# Patient Record
Sex: FEMALE | Race: BLACK OR AFRICAN AMERICAN | NOT HISPANIC OR LATINO | Employment: OTHER | RURAL
[De-identification: names, ages, dates, MRNs, and addresses within clinical notes are randomized per-mention and may not be internally consistent; named-entity substitution may affect disease eponyms.]

---

## 2017-11-28 ENCOUNTER — HISTORICAL (OUTPATIENT)
Dept: ADMINISTRATIVE | Facility: HOSPITAL | Age: 72
End: 2017-11-28

## 2017-12-01 LAB
LAB AP CLINICAL INFORMATION: NORMAL
LAB AP GENERAL CAT - HISTORICAL: NORMAL
LAB AP INTERPRETATION/RESULT - HISTORICAL: NEGATIVE
LAB AP SPECIMEN ADEQUACY - HISTORICAL: NORMAL
LAB AP SPECIMEN SUBMITTED - HISTORICAL: NORMAL

## 2019-12-09 ENCOUNTER — HISTORICAL (OUTPATIENT)
Dept: ADMINISTRATIVE | Facility: HOSPITAL | Age: 74
End: 2019-12-09

## 2019-12-11 LAB
LAB AP CLINICAL INFORMATION: NORMAL
LAB AP COMMENTS: NORMAL
LAB AP GENERAL CAT - HISTORICAL: NORMAL
LAB AP INTERPRETATION/RESULT - HISTORICAL: NEGATIVE
LAB AP SPECIMEN ADEQUACY - HISTORICAL: NORMAL
LAB AP SPECIMEN SUBMITTED - HISTORICAL: NORMAL

## 2021-10-21 ENCOUNTER — OFFICE VISIT (OUTPATIENT)
Dept: FAMILY MEDICINE | Facility: CLINIC | Age: 76
End: 2021-10-21
Payer: MEDICARE

## 2021-10-21 VITALS
HEIGHT: 71 IN | DIASTOLIC BLOOD PRESSURE: 69 MMHG | RESPIRATION RATE: 18 BRPM | WEIGHT: 216.38 LBS | OXYGEN SATURATION: 99 % | HEART RATE: 72 BPM | BODY MASS INDEX: 30.29 KG/M2 | SYSTOLIC BLOOD PRESSURE: 116 MMHG

## 2021-10-21 DIAGNOSIS — I10 PRIMARY HYPERTENSION: Chronic | ICD-10-CM

## 2021-10-21 DIAGNOSIS — J45.909 ASTHMA, UNSPECIFIED ASTHMA SEVERITY, UNSPECIFIED WHETHER COMPLICATED, UNSPECIFIED WHETHER PERSISTENT: Chronic | ICD-10-CM

## 2021-10-21 DIAGNOSIS — I50.9 CONGESTIVE HEART FAILURE, UNSPECIFIED HF CHRONICITY, UNSPECIFIED HEART FAILURE TYPE: Primary | Chronic | ICD-10-CM

## 2021-10-21 DIAGNOSIS — R05.9 COUGH: Chronic | ICD-10-CM

## 2021-10-21 PROCEDURE — 99204 OFFICE O/P NEW MOD 45 MIN: CPT | Mod: ,,, | Performed by: INTERNAL MEDICINE

## 2021-10-21 PROCEDURE — 99204 PR OFFICE/OUTPT VISIT, NEW, LEVL IV, 45-59 MIN: ICD-10-PCS | Mod: ,,, | Performed by: INTERNAL MEDICINE

## 2021-10-21 PROCEDURE — 90686 IIV4 VACC NO PRSV 0.5 ML IM: CPT | Mod: ,,, | Performed by: INTERNAL MEDICINE

## 2021-10-21 PROCEDURE — 90686 FLU VACCINE (QUAD) GREATER THAN OR EQUAL TO 3YO PRESERVATIVE FREE IM: ICD-10-PCS | Mod: ,,, | Performed by: INTERNAL MEDICINE

## 2021-10-21 PROCEDURE — G0008 FLU VACCINE (QUAD) GREATER THAN OR EQUAL TO 3YO PRESERVATIVE FREE IM: ICD-10-PCS | Mod: ,,, | Performed by: INTERNAL MEDICINE

## 2021-10-21 PROCEDURE — G0008 ADMIN INFLUENZA VIRUS VAC: HCPCS | Mod: ,,, | Performed by: INTERNAL MEDICINE

## 2021-10-21 RX ORDER — FUROSEMIDE 80 MG/1
80 TABLET ORAL DAILY
COMMUNITY
Start: 2021-08-26 | End: 2023-08-03 | Stop reason: SDUPTHER

## 2021-10-21 RX ORDER — CARVEDILOL 25 MG/1
25 TABLET ORAL 2 TIMES DAILY WITH MEALS
COMMUNITY
Start: 2021-10-04

## 2021-10-21 RX ORDER — ALBUTEROL SULFATE 90 UG/1
2 AEROSOL, METERED RESPIRATORY (INHALATION) EVERY 6 HOURS PRN
Qty: 6.7 G | Refills: 3 | Status: SHIPPED | OUTPATIENT
Start: 2021-10-21 | End: 2023-11-20 | Stop reason: SDUPTHER

## 2021-10-21 RX ORDER — SACUBITRIL AND VALSARTAN 97; 103 MG/1; MG/1
1 TABLET, FILM COATED ORAL 2 TIMES DAILY
COMMUNITY
Start: 2021-10-04

## 2021-10-21 RX ORDER — SPIRONOLACTONE 25 MG/1
50 TABLET ORAL DAILY
COMMUNITY
Start: 2021-10-04 | End: 2023-09-26 | Stop reason: SDUPTHER

## 2021-10-21 RX ORDER — GUAIFENESIN 600 MG/1
600 TABLET, EXTENDED RELEASE ORAL 2 TIMES DAILY PRN
Qty: 20 TABLET | Refills: 1 | Status: SHIPPED | OUTPATIENT
Start: 2021-10-21 | End: 2023-10-24

## 2021-10-25 DIAGNOSIS — I50.9 CONGESTIVE HEART FAILURE, UNSPECIFIED HF CHRONICITY, UNSPECIFIED HEART FAILURE TYPE: Primary | ICD-10-CM

## 2021-10-25 LAB
ANION GAP SERPL CALCULATED.3IONS-SCNC: 13 MMOL/L (ref 7–16)
BUN SERPL-MCNC: 30 MG/DL (ref 7–18)
BUN/CREAT SERPL: 21 (ref 6–20)
CALCIUM SERPL-MCNC: 9.5 MG/DL (ref 8.5–10.1)
CHLORIDE SERPL-SCNC: 105 MMOL/L (ref 98–107)
CO2 SERPL-SCNC: 26 MMOL/L (ref 21–32)
CREAT SERPL-MCNC: 1.43 MG/DL (ref 0.55–1.02)
GLUCOSE SERPL-MCNC: 85 MG/DL (ref 74–106)
NT-PROBNP SERPL-MCNC: 237 PG/ML (ref 1–450)
POTASSIUM SERPL-SCNC: 4.4 MMOL/L (ref 3.5–5.1)
SODIUM SERPL-SCNC: 140 MMOL/L (ref 136–145)

## 2021-10-25 PROCEDURE — 80048 BASIC METABOLIC PNL TOTAL CA: CPT | Mod: ,,, | Performed by: CLINICAL MEDICAL LABORATORY

## 2021-10-25 PROCEDURE — 80048 BASIC METABOLIC PANEL: ICD-10-PCS | Mod: ,,, | Performed by: CLINICAL MEDICAL LABORATORY

## 2021-10-25 PROCEDURE — 83880 NT-PRO NATRIURETIC PEPTIDE: ICD-10-PCS | Mod: ,,, | Performed by: CLINICAL MEDICAL LABORATORY

## 2021-10-25 PROCEDURE — 83880 ASSAY OF NATRIURETIC PEPTIDE: CPT | Mod: ,,, | Performed by: CLINICAL MEDICAL LABORATORY

## 2021-11-18 ENCOUNTER — OFFICE VISIT (OUTPATIENT)
Dept: FAMILY MEDICINE | Facility: CLINIC | Age: 76
End: 2021-11-18
Payer: MEDICARE

## 2021-11-18 VITALS
BODY MASS INDEX: 30.57 KG/M2 | RESPIRATION RATE: 18 BRPM | SYSTOLIC BLOOD PRESSURE: 110 MMHG | OXYGEN SATURATION: 100 % | HEIGHT: 71 IN | WEIGHT: 218.38 LBS | HEART RATE: 60 BPM | DIASTOLIC BLOOD PRESSURE: 64 MMHG

## 2021-11-18 DIAGNOSIS — I10 PRIMARY HYPERTENSION: Primary | ICD-10-CM

## 2021-11-18 DIAGNOSIS — I50.9 CONGESTIVE HEART FAILURE, UNSPECIFIED HF CHRONICITY, UNSPECIFIED HEART FAILURE TYPE: ICD-10-CM

## 2021-11-18 PROCEDURE — 99213 PR OFFICE/OUTPT VISIT, EST, LEVL III, 20-29 MIN: ICD-10-PCS | Mod: ,,, | Performed by: INTERNAL MEDICINE

## 2021-11-18 PROCEDURE — 99213 OFFICE O/P EST LOW 20 MIN: CPT | Mod: ,,, | Performed by: INTERNAL MEDICINE

## 2021-11-22 ENCOUNTER — CLINICAL SUPPORT (OUTPATIENT)
Dept: FAMILY MEDICINE | Facility: CLINIC | Age: 76
End: 2021-11-22
Payer: MEDICARE

## 2021-11-22 DIAGNOSIS — Z23 NEED FOR VACCINATION: Primary | ICD-10-CM

## 2022-02-21 ENCOUNTER — OFFICE VISIT (OUTPATIENT)
Dept: FAMILY MEDICINE | Facility: CLINIC | Age: 77
End: 2022-02-21
Payer: MEDICARE

## 2022-02-21 VITALS
TEMPERATURE: 98 F | DIASTOLIC BLOOD PRESSURE: 60 MMHG | SYSTOLIC BLOOD PRESSURE: 103 MMHG | BODY MASS INDEX: 30.24 KG/M2 | OXYGEN SATURATION: 98 % | WEIGHT: 216 LBS | HEIGHT: 71 IN | RESPIRATION RATE: 18 BRPM | HEART RATE: 80 BPM

## 2022-02-21 DIAGNOSIS — Z79.899 OTHER LONG TERM (CURRENT) DRUG THERAPY: ICD-10-CM

## 2022-02-21 DIAGNOSIS — I10 PRIMARY HYPERTENSION: Primary | ICD-10-CM

## 2022-02-21 DIAGNOSIS — R53.83 OTHER FATIGUE: ICD-10-CM

## 2022-02-21 LAB
ANION GAP SERPL CALCULATED.3IONS-SCNC: 8 MMOL/L (ref 7–16)
BASOPHILS # BLD AUTO: 0.03 K/UL (ref 0–0.2)
BASOPHILS NFR BLD AUTO: 0.6 % (ref 0–1)
BUN SERPL-MCNC: 26 MG/DL (ref 7–18)
BUN/CREAT SERPL: 16 (ref 6–20)
CALCIUM SERPL-MCNC: 9.3 MG/DL (ref 8.5–10.1)
CHLORIDE SERPL-SCNC: 104 MMOL/L (ref 98–107)
CO2 SERPL-SCNC: 29 MMOL/L (ref 21–32)
CREAT SERPL-MCNC: 1.61 MG/DL (ref 0.55–1.02)
DIFFERENTIAL METHOD BLD: ABNORMAL
EOSINOPHIL # BLD AUTO: 0.16 K/UL (ref 0–0.5)
EOSINOPHIL NFR BLD AUTO: 3.1 % (ref 1–4)
ERYTHROCYTE [DISTWIDTH] IN BLOOD BY AUTOMATED COUNT: 14.1 % (ref 11.5–14.5)
GLUCOSE SERPL-MCNC: 89 MG/DL (ref 74–106)
HCT VFR BLD AUTO: 35.5 % (ref 38–47)
HGB BLD-MCNC: 11.6 G/DL (ref 12–16)
IMM GRANULOCYTES # BLD AUTO: 0.01 K/UL (ref 0–0.04)
IMM GRANULOCYTES NFR BLD: 0.2 % (ref 0–0.4)
LYMPHOCYTES # BLD AUTO: 2.12 K/UL (ref 1–4.8)
LYMPHOCYTES NFR BLD AUTO: 41.7 % (ref 27–41)
MCH RBC QN AUTO: 30.9 PG (ref 27–31)
MCHC RBC AUTO-ENTMCNC: 32.7 G/DL (ref 32–36)
MCV RBC AUTO: 94.4 FL (ref 80–96)
MONOCYTES # BLD AUTO: 0.52 K/UL (ref 0–0.8)
MONOCYTES NFR BLD AUTO: 10.2 % (ref 2–6)
MPC BLD CALC-MCNC: 11 FL (ref 9.4–12.4)
NEUTROPHILS # BLD AUTO: 2.24 K/UL (ref 1.8–7.7)
NEUTROPHILS NFR BLD AUTO: 44.2 % (ref 53–65)
NRBC # BLD AUTO: 0 X10E3/UL
NRBC, AUTO (.00): 0 %
PLATELET # BLD AUTO: 222 K/UL (ref 150–400)
POTASSIUM SERPL-SCNC: 4.4 MMOL/L (ref 3.5–5.1)
RBC # BLD AUTO: 3.76 M/UL (ref 4.2–5.4)
SODIUM SERPL-SCNC: 137 MMOL/L (ref 136–145)
TSH SERPL DL<=0.005 MIU/L-ACNC: 1.71 UIU/ML (ref 0.36–3.74)
VIT B12 SERPL-MCNC: 595 PG/ML (ref 193–986)
WBC # BLD AUTO: 5.08 K/UL (ref 4.5–11)

## 2022-02-21 PROCEDURE — 99214 OFFICE O/P EST MOD 30 MIN: CPT | Mod: ,,, | Performed by: INTERNAL MEDICINE

## 2022-02-21 PROCEDURE — 84443 TSH: ICD-10-PCS | Mod: ,,, | Performed by: CLINICAL MEDICAL LABORATORY

## 2022-02-21 PROCEDURE — 85025 CBC WITH DIFFERENTIAL: ICD-10-PCS | Mod: ,,, | Performed by: CLINICAL MEDICAL LABORATORY

## 2022-02-21 PROCEDURE — 80048 BASIC METABOLIC PNL TOTAL CA: CPT | Mod: ,,, | Performed by: CLINICAL MEDICAL LABORATORY

## 2022-02-21 PROCEDURE — 85025 COMPLETE CBC W/AUTO DIFF WBC: CPT | Mod: ,,, | Performed by: CLINICAL MEDICAL LABORATORY

## 2022-02-21 PROCEDURE — 80048 BASIC METABOLIC PANEL: ICD-10-PCS | Mod: ,,, | Performed by: CLINICAL MEDICAL LABORATORY

## 2022-02-21 PROCEDURE — 82607 VITAMIN B12: ICD-10-PCS | Mod: ,,, | Performed by: CLINICAL MEDICAL LABORATORY

## 2022-02-21 PROCEDURE — 82607 VITAMIN B-12: CPT | Mod: ,,, | Performed by: CLINICAL MEDICAL LABORATORY

## 2022-02-21 PROCEDURE — 99214 PR OFFICE/OUTPT VISIT, EST, LEVL IV, 30-39 MIN: ICD-10-PCS | Mod: ,,, | Performed by: INTERNAL MEDICINE

## 2022-02-21 PROCEDURE — 84443 ASSAY THYROID STIM HORMONE: CPT | Mod: ,,, | Performed by: CLINICAL MEDICAL LABORATORY

## 2022-02-22 NOTE — PROGRESS NOTES
Subjective:       Patient ID: Taina Medrano is a 77 y.o. female.    Chief Complaint: Hypertension    Patient is here today for check up evaluation. Patient states has been having lack of energy. States has been taking care of her sick  and has not gotten much sleep. Recommend taking a multi vitamin which she states she does already. Last labs done 3 months ago show slight dehydration. Advise to increase water intake. Will order repeat lab work today and order Vit B12 and TSH level. Will follow in 2 months.       Current Medications:    Current Outpatient Medications:     albuterol (PROVENTIL HFA) 90 mcg/actuation inhaler, Inhale 2 puffs into the lungs every 6 (six) hours as needed for Wheezing. Rescue, Disp: 6.7 g, Rfl: 3    carvediloL (COREG) 25 MG tablet, Take 25 mg by mouth 2 (two) times daily with meals. , Disp: , Rfl:     ENTRESTO  mg per tablet, Take 1 tablet by mouth 2 (two) times daily. , Disp: , Rfl:     furosemide (LASIX) 80 MG tablet, Take 80 mg by mouth once daily. , Disp: , Rfl:     guaiFENesin (MUCINEX) 600 mg 12 hr tablet, Take 1 tablet (600 mg total) by mouth 2 (two) times daily as needed for Congestion., Disp: 20 tablet, Rfl: 1    spironolactone (ALDACTONE) 25 MG tablet, Take 50 mg by mouth once daily. , Disp: , Rfl:     Last Labs:     Office Visit on 02/21/2022   Component Date Value    Sodium 02/21/2022 137     Potassium 02/21/2022 4.4     Chloride 02/21/2022 104     CO2 02/21/2022 29     Anion Gap 02/21/2022 8     Glucose 02/21/2022 89     BUN 02/21/2022 26 (A)    Creatinine 02/21/2022 1.61 (A)    BUN/Creatinine Ratio 02/21/2022 16     Calcium 02/21/2022 9.3     eGFR 02/21/2022 33 (A)    Vitamin B12 02/21/2022 595     TSH 02/21/2022 1.710     WBC 02/21/2022 5.08     RBC 02/21/2022 3.76 (A)    Hemoglobin 02/21/2022 11.6 (A)    Hematocrit 02/21/2022 35.5 (A)    MCV 02/21/2022 94.4     MCH 02/21/2022 30.9     MCHC 02/21/2022 32.7     RDW 02/21/2022 14.1      Platelet Count 02/21/2022 222     MPV 02/21/2022 11.0     Neutrophils % 02/21/2022 44.2 (A)    Lymphocytes % 02/21/2022 41.7 (A)    Monocytes % 02/21/2022 10.2 (A)    Eosinophils % 02/21/2022 3.1     Basophils % 02/21/2022 0.6     Immature Granulocytes % 02/21/2022 0.2     nRBC, Auto 02/21/2022 0.0     Neutrophils, Abs 02/21/2022 2.24     Lymphocytes, Absolute 02/21/2022 2.12     Monocytes, Absolute 02/21/2022 0.52     Eosinophils, Absolute 02/21/2022 0.16     Basophils, Absolute 02/21/2022 0.03     Immature Granulocytes, A* 02/21/2022 0.01     nRBC, Absolute 02/21/2022 0.00     Diff Type 02/21/2022 Auto        Last Imaging:  No image results found.         Review of Systems   Constitutional: Positive for activity change and fatigue.   All other systems reviewed and are negative.        Objective:      Physical Exam  Vitals reviewed.   Constitutional:       Appearance: Normal appearance.   Cardiovascular:      Rate and Rhythm: Normal rate and regular rhythm.      Pulses: Normal pulses.      Heart sounds: Normal heart sounds.   Pulmonary:      Effort: Pulmonary effort is normal.      Breath sounds: Normal breath sounds.   Abdominal:      General: Abdomen is flat. Bowel sounds are normal.      Palpations: Abdomen is soft.   Musculoskeletal:         General: Normal range of motion.      Cervical back: Normal range of motion and neck supple.   Skin:     General: Skin is warm and dry.   Neurological:      General: No focal deficit present.      Mental Status: She is alert and oriented to person, place, and time. Mental status is at baseline.         Assessment:       1. Primary hypertension  Basic Metabolic Panel    CBC Auto Differential    Vitamin B12    TSH    Basic Metabolic Panel    CBC Auto Differential    Vitamin B12    TSH   2. Other fatigue  Basic Metabolic Panel    CBC Auto Differential    Vitamin B12    TSH    Basic Metabolic Panel    CBC Auto Differential    Vitamin B12    TSH   3. Other long  term (current) drug therapy   Vitamin B12    Vitamin B12        Plan:         Taina was seen today for hypertension.    Diagnoses and all orders for this visit:    Primary hypertension  -     Basic Metabolic Panel; Future  -     CBC Auto Differential; Future  -     Vitamin B12; Future  -     TSH; Future  -     Basic Metabolic Panel  -     CBC Auto Differential  -     Vitamin B12  -     TSH    Other fatigue  -     Basic Metabolic Panel; Future  -     CBC Auto Differential; Future  -     Vitamin B12; Future  -     TSH; Future  -     Basic Metabolic Panel  -     CBC Auto Differential  -     Vitamin B12  -     TSH    Other long term (current) drug therapy   -     Vitamin B12; Future  -     Vitamin B12

## 2022-03-11 DIAGNOSIS — Z71.89 COMPLEX CARE COORDINATION: ICD-10-CM

## 2022-05-11 ENCOUNTER — OFFICE VISIT (OUTPATIENT)
Dept: FAMILY MEDICINE | Facility: CLINIC | Age: 77
End: 2022-05-11
Payer: MEDICARE

## 2022-05-11 VITALS
SYSTOLIC BLOOD PRESSURE: 110 MMHG | DIASTOLIC BLOOD PRESSURE: 70 MMHG | BODY MASS INDEX: 29.82 KG/M2 | HEIGHT: 71 IN | RESPIRATION RATE: 16 BRPM | OXYGEN SATURATION: 98 % | WEIGHT: 213 LBS | HEART RATE: 70 BPM | TEMPERATURE: 98 F

## 2022-05-11 DIAGNOSIS — I25.10 CORONARY ARTERIOSCLEROSIS: ICD-10-CM

## 2022-05-11 DIAGNOSIS — E78.5 HYPERLIPIDEMIA, UNSPECIFIED HYPERLIPIDEMIA TYPE: ICD-10-CM

## 2022-05-11 DIAGNOSIS — Z12.4 SCREENING FOR CERVICAL CANCER: ICD-10-CM

## 2022-05-11 DIAGNOSIS — Z00.00 ENCOUNTER FOR SUBSEQUENT ANNUAL WELLNESS VISIT (AWV) IN MEDICARE PATIENT: Primary | ICD-10-CM

## 2022-05-11 DIAGNOSIS — Z13.820 SCREENING FOR OSTEOPOROSIS: ICD-10-CM

## 2022-05-11 DIAGNOSIS — Z12.39 ENCOUNTER FOR SCREENING FOR MALIGNANT NEOPLASM OF BREAST, UNSPECIFIED SCREENING MODALITY: ICD-10-CM

## 2022-05-11 DIAGNOSIS — Z12.31 ENCOUNTER FOR SCREENING MAMMOGRAM FOR MALIGNANT NEOPLASM OF BREAST: ICD-10-CM

## 2022-05-11 DIAGNOSIS — Z78.0 POST-MENOPAUSAL: ICD-10-CM

## 2022-05-11 DIAGNOSIS — Z11.59 SCREENING FOR VIRAL DISEASE: ICD-10-CM

## 2022-05-11 DIAGNOSIS — I10 ESSENTIAL HYPERTENSION: ICD-10-CM

## 2022-05-11 LAB
CHOLEST SERPL-MCNC: 179 MG/DL (ref 0–200)
CHOLEST/HDLC SERPL: 3.1 {RATIO}
HCV AB SER QL: NORMAL
HDLC SERPL-MCNC: 58 MG/DL (ref 40–60)
LDLC SERPL CALC-MCNC: 102 MG/DL
LDLC/HDLC SERPL: 1.8 {RATIO}
NONHDLC SERPL-MCNC: 121 MG/DL
TRIGL SERPL-MCNC: 94 MG/DL (ref 35–150)
VLDLC SERPL-MCNC: 19 MG/DL

## 2022-05-11 PROCEDURE — 80061 LIPID PANEL: CPT | Mod: ,,, | Performed by: CLINICAL MEDICAL LABORATORY

## 2022-05-11 PROCEDURE — 80061 LIPID PANEL: ICD-10-PCS | Mod: ,,, | Performed by: CLINICAL MEDICAL LABORATORY

## 2022-05-11 PROCEDURE — 86803 HEPATITIS C ANTIBODY: ICD-10-PCS | Mod: ,,, | Performed by: CLINICAL MEDICAL LABORATORY

## 2022-05-11 PROCEDURE — 86803 HEPATITIS C AB TEST: CPT | Mod: ,,, | Performed by: CLINICAL MEDICAL LABORATORY

## 2022-05-11 PROCEDURE — G0439 PR MEDICARE ANNUAL WELLNESS SUBSEQUENT VISIT: ICD-10-PCS | Mod: ,,, | Performed by: NURSE PRACTITIONER

## 2022-05-11 PROCEDURE — G0439 PPPS, SUBSEQ VISIT: HCPCS | Mod: ,,, | Performed by: NURSE PRACTITIONER

## 2022-05-11 NOTE — PATIENT INSTRUCTIONS
Counseling and Referral of Other Preventative  (Italic type indicates deductible and co-insurance are waived)    Patient Name: Taina Medrano  Today's Date: 5/11/2022    Health Maintenance         Date Due Completion Date    Hepatitis C Screening Never done ---    Lipid Panel Never done ---    TETANUS VACCINE Never done ---    DEXA Scan Never done ---    Shingles Vaccine (1 of 2) Never done ---    Pneumococcal Vaccines (Age 65+) (1 - PCV) Never done ---    COVID-19 Vaccine (4 - Booster for Moderna series) 03/20/2022 11/20/2021          No orders of the defined types were placed in this encounter.

## 2022-05-13 ENCOUNTER — IMMUNIZATION (OUTPATIENT)
Dept: FAMILY MEDICINE | Facility: CLINIC | Age: 77
End: 2022-05-13
Payer: MEDICARE

## 2022-05-13 DIAGNOSIS — Z23 NEED FOR VACCINATION: Primary | ICD-10-CM

## 2022-05-13 PROCEDURE — 91306 COVID-19, MRNA, LNP-S, PF, 100 MCG/0.25 ML DOSE VACCINE (MODERNA BOOSTER): CPT | Mod: ,,, | Performed by: INTERNAL MEDICINE

## 2022-05-13 PROCEDURE — 0064A COVID-19, MRNA, LNP-S, PF, 100 MCG/0.25 ML DOSE VACCINE (MODERNA BOOSTER): ICD-10-PCS | Mod: ,,, | Performed by: INTERNAL MEDICINE

## 2022-05-13 PROCEDURE — 0064A COVID-19, MRNA, LNP-S, PF, 100 MCG/0.25 ML DOSE VACCINE (MODERNA BOOSTER): CPT | Mod: ,,, | Performed by: INTERNAL MEDICINE

## 2022-05-13 PROCEDURE — 91306 COVID-19, MRNA, LNP-S, PF, 100 MCG/0.25 ML DOSE VACCINE (MODERNA BOOSTER): ICD-10-PCS | Mod: ,,, | Performed by: INTERNAL MEDICINE

## 2022-05-17 ENCOUNTER — OFFICE VISIT (OUTPATIENT)
Dept: FAMILY MEDICINE | Facility: CLINIC | Age: 77
End: 2022-05-17
Payer: MEDICARE

## 2022-05-17 VITALS
TEMPERATURE: 98 F | DIASTOLIC BLOOD PRESSURE: 56 MMHG | WEIGHT: 211.38 LBS | BODY MASS INDEX: 29.59 KG/M2 | RESPIRATION RATE: 20 BRPM | HEART RATE: 86 BPM | SYSTOLIC BLOOD PRESSURE: 108 MMHG | OXYGEN SATURATION: 99 % | HEIGHT: 71 IN

## 2022-05-17 DIAGNOSIS — D63.8 ANEMIA IN OTHER CHRONIC DISEASES CLASSIFIED ELSEWHERE: ICD-10-CM

## 2022-05-17 DIAGNOSIS — N18.2 STAGE 2 CHRONIC KIDNEY DISEASE: Primary | ICD-10-CM

## 2022-05-17 LAB
CREAT UR-MCNC: 98 MG/DL (ref 28–219)
MICROALBUMIN UR-MCNC: 0.7 MG/DL (ref 0–2.8)
MICROALBUMIN/CREAT RATIO PNL UR: 7.1 MG/G (ref 0–30)
PROT UR-MCNC: <5 MG/DL (ref 0–11.9)

## 2022-05-17 PROCEDURE — 84156 PROTEIN, QUANTITATIVE, URINE RANDOM: ICD-10-PCS | Mod: ,,, | Performed by: CLINICAL MEDICAL LABORATORY

## 2022-05-17 PROCEDURE — 99214 OFFICE O/P EST MOD 30 MIN: CPT | Mod: ,,, | Performed by: INTERNAL MEDICINE

## 2022-05-17 PROCEDURE — 82043 MICROALBUMIN / CREATININE RATIO URINE: ICD-10-PCS | Mod: ,,, | Performed by: CLINICAL MEDICAL LABORATORY

## 2022-05-17 PROCEDURE — 82570 ASSAY OF URINE CREATININE: CPT | Mod: ,,, | Performed by: CLINICAL MEDICAL LABORATORY

## 2022-05-17 PROCEDURE — 99214 PR OFFICE/OUTPT VISIT, EST, LEVL IV, 30-39 MIN: ICD-10-PCS | Mod: ,,, | Performed by: INTERNAL MEDICINE

## 2022-05-17 PROCEDURE — 82043 UR ALBUMIN QUANTITATIVE: CPT | Mod: ,,, | Performed by: CLINICAL MEDICAL LABORATORY

## 2022-05-17 PROCEDURE — 82570 MICROALBUMIN / CREATININE RATIO URINE: ICD-10-PCS | Mod: ,,, | Performed by: CLINICAL MEDICAL LABORATORY

## 2022-05-17 PROCEDURE — 84156 ASSAY OF PROTEIN URINE: CPT | Mod: ,,, | Performed by: CLINICAL MEDICAL LABORATORY

## 2022-05-17 NOTE — PATIENT INSTRUCTIONS
Taina was seen today for follow-up and hypertension.    Diagnoses and all orders for this visit:    Stage 2 chronic kidney disease  -     Protein, Random Urine; Future  -     Microalbumin/Creatinine Ratio, Urine; Future  -     Protein, Random Urine  -     Microalbumin/Creatinine Ratio, Urine    Anemia in other chronic diseases classified elsewhere

## 2022-05-17 NOTE — PROGRESS NOTES
Subjective:       Patient ID: Taina Medrano is a 77 y.o. female.    Chief Complaint: Follow-up and Hypertension    Patient is here today for check up evaluation. Patient pressure is stable today. Recent labs reviewed and all within normal limits. Does show slight abnormal kidney function. Advise to increase water intake. Will check urine today for albumin and protein. Will follow in 3 months.     Follow-up    Hypertension        Current Medications:    Current Outpatient Medications:     albuterol (PROVENTIL HFA) 90 mcg/actuation inhaler, Inhale 2 puffs into the lungs every 6 (six) hours as needed for Wheezing. Rescue, Disp: 6.7 g, Rfl: 3    carvediloL (COREG) 25 MG tablet, Take 25 mg by mouth 2 (two) times daily with meals. , Disp: , Rfl:     ENTRESTO  mg per tablet, Take 1 tablet by mouth 2 (two) times daily. , Disp: , Rfl:     furosemide (LASIX) 80 MG tablet, Take 80 mg by mouth once daily. , Disp: , Rfl:     guaiFENesin (MUCINEX) 600 mg 12 hr tablet, Take 1 tablet (600 mg total) by mouth 2 (two) times daily as needed for Congestion., Disp: 20 tablet, Rfl: 1    spironolactone (ALDACTONE) 25 MG tablet, Take 50 mg by mouth once daily. , Disp: , Rfl:     Last Labs:     Office Visit on 05/11/2022   Component Date Value    Hepatitis C Ab 05/11/2022 Non-Reactive     Triglycerides 05/11/2022 94     Cholesterol 05/11/2022 179     HDL Cholesterol 05/11/2022 58     Cholesterol/HDL Ratio (R* 05/11/2022 3.1     Non-HDL 05/11/2022 121     LDL Calculated 05/11/2022 102     LDL/HDL 05/11/2022 1.8     VLDL 05/11/2022 19        Last Imaging:  No image results found.         Review of Systems   All other systems reviewed and are negative.        Objective:      Physical Exam  Vitals reviewed.   Constitutional:       Appearance: Normal appearance.   Cardiovascular:      Rate and Rhythm: Normal rate and regular rhythm.      Pulses: Normal pulses.      Heart sounds: Normal heart sounds.   Pulmonary:      Effort:  Pulmonary effort is normal.      Breath sounds: Normal breath sounds.   Abdominal:      General: Abdomen is flat. Bowel sounds are normal.      Palpations: Abdomen is soft.   Musculoskeletal:         General: Normal range of motion.      Cervical back: Normal range of motion and neck supple.   Skin:     General: Skin is warm and dry.   Neurological:      General: No focal deficit present.      Mental Status: She is alert and oriented to person, place, and time. Mental status is at baseline.         Assessment:       1. Stage 2 chronic kidney disease  Protein, Random Urine    Microalbumin/Creatinine Ratio, Urine    Protein, Random Urine    Microalbumin/Creatinine Ratio, Urine   2. Anemia in other chronic diseases classified elsewhere          Plan:         Taina was seen today for follow-up and hypertension.    Diagnoses and all orders for this visit:    Stage 2 chronic kidney disease  -     Protein, Random Urine; Future  -     Microalbumin/Creatinine Ratio, Urine; Future  -     Protein, Random Urine  -     Microalbumin/Creatinine Ratio, Urine    Anemia in other chronic diseases classified elsewhere

## 2022-07-13 ENCOUNTER — HOSPITAL ENCOUNTER (OUTPATIENT)
Dept: RADIOLOGY | Facility: HOSPITAL | Age: 77
Discharge: HOME | End: 2022-07-13
Attending: NURSE PRACTITIONER
Payer: MEDICARE

## 2022-07-13 VITALS — BODY MASS INDEX: 29.96 KG/M2 | HEIGHT: 71 IN | WEIGHT: 214 LBS

## 2022-07-13 DIAGNOSIS — Z12.31 ENCOUNTER FOR SCREENING MAMMOGRAM FOR MALIGNANT NEOPLASM OF BREAST: ICD-10-CM

## 2022-07-13 DIAGNOSIS — Z12.39 ENCOUNTER FOR SCREENING FOR MALIGNANT NEOPLASM OF BREAST, UNSPECIFIED SCREENING MODALITY: ICD-10-CM

## 2022-07-13 PROCEDURE — 77067 MAMMO DIGITAL SCREENING BILAT: ICD-10-PCS | Mod: 26,,, | Performed by: RADIOLOGY

## 2022-07-13 PROCEDURE — 77067 SCR MAMMO BI INCL CAD: CPT | Mod: TC

## 2022-07-13 PROCEDURE — 77067 SCR MAMMO BI INCL CAD: CPT | Mod: 26,,, | Performed by: RADIOLOGY

## 2022-10-09 DIAGNOSIS — Z71.89 COMPLEX CARE COORDINATION: ICD-10-CM

## 2022-10-27 ENCOUNTER — APPOINTMENT (OUTPATIENT)
Dept: RADIOLOGY | Facility: CLINIC | Age: 77
End: 2022-10-27
Attending: INTERNAL MEDICINE
Payer: MEDICARE

## 2022-10-27 ENCOUNTER — OFFICE VISIT (OUTPATIENT)
Dept: FAMILY MEDICINE | Facility: CLINIC | Age: 77
End: 2022-10-27
Payer: MEDICARE

## 2022-10-27 VITALS
RESPIRATION RATE: 18 BRPM | WEIGHT: 217.38 LBS | SYSTOLIC BLOOD PRESSURE: 120 MMHG | HEIGHT: 71 IN | OXYGEN SATURATION: 98 % | BODY MASS INDEX: 30.43 KG/M2 | HEART RATE: 71 BPM | DIASTOLIC BLOOD PRESSURE: 64 MMHG | TEMPERATURE: 97 F

## 2022-10-27 DIAGNOSIS — I50.9 CONGESTIVE HEART FAILURE, UNSPECIFIED HF CHRONICITY, UNSPECIFIED HEART FAILURE TYPE: Primary | ICD-10-CM

## 2022-10-27 DIAGNOSIS — I50.9 CONGESTIVE HEART FAILURE, UNSPECIFIED HF CHRONICITY, UNSPECIFIED HEART FAILURE TYPE: ICD-10-CM

## 2022-10-27 DIAGNOSIS — J40 BRONCHITIS: ICD-10-CM

## 2022-10-27 DIAGNOSIS — Z91.89 AT RISK FOR LOSS OF BONE DENSITY: ICD-10-CM

## 2022-10-27 DIAGNOSIS — Z78.0 ASYMPTOMATIC MENOPAUSAL STATE: ICD-10-CM

## 2022-10-27 DIAGNOSIS — J06.9 UPPER RESPIRATORY TRACT INFECTION, UNSPECIFIED TYPE: ICD-10-CM

## 2022-10-27 DIAGNOSIS — Z78.0 POST-MENOPAUSAL: ICD-10-CM

## 2022-10-27 LAB
ANION GAP SERPL CALCULATED.3IONS-SCNC: 9 MMOL/L (ref 7–16)
BASOPHILS # BLD AUTO: 0.04 K/UL (ref 0–0.2)
BASOPHILS NFR BLD AUTO: 0.7 % (ref 0–1)
BUN SERPL-MCNC: 25 MG/DL (ref 7–18)
BUN/CREAT SERPL: 19 (ref 6–20)
CALCIUM SERPL-MCNC: 9.9 MG/DL (ref 8.5–10.1)
CHLORIDE SERPL-SCNC: 103 MMOL/L (ref 98–107)
CO2 SERPL-SCNC: 27 MMOL/L (ref 21–32)
CREAT SERPL-MCNC: 1.33 MG/DL (ref 0.55–1.02)
DIFFERENTIAL METHOD BLD: ABNORMAL
EGFR (NO RACE VARIABLE) (RUSH/TITUS): 41 ML/MIN/1.73M²
EOSINOPHIL # BLD AUTO: 0.19 K/UL (ref 0–0.5)
EOSINOPHIL NFR BLD AUTO: 3.4 % (ref 1–4)
ERYTHROCYTE [DISTWIDTH] IN BLOOD BY AUTOMATED COUNT: 14.1 % (ref 11.5–14.5)
GLUCOSE SERPL-MCNC: 82 MG/DL (ref 74–106)
HCT VFR BLD AUTO: 38 % (ref 38–47)
HGB BLD-MCNC: 12.4 G/DL (ref 12–16)
IMM GRANULOCYTES # BLD AUTO: 0.01 K/UL (ref 0–0.04)
IMM GRANULOCYTES NFR BLD: 0.2 % (ref 0–0.4)
LYMPHOCYTES # BLD AUTO: 2.25 K/UL (ref 1–4.8)
LYMPHOCYTES NFR BLD AUTO: 40.3 % (ref 27–41)
MCH RBC QN AUTO: 30.7 PG (ref 27–31)
MCHC RBC AUTO-ENTMCNC: 32.6 G/DL (ref 32–36)
MCV RBC AUTO: 94.1 FL (ref 80–96)
MONOCYTES # BLD AUTO: 0.5 K/UL (ref 0–0.8)
MONOCYTES NFR BLD AUTO: 8.9 % (ref 2–6)
MPC BLD CALC-MCNC: 11.4 FL (ref 9.4–12.4)
NEUTROPHILS # BLD AUTO: 2.6 K/UL (ref 1.8–7.7)
NEUTROPHILS NFR BLD AUTO: 46.5 % (ref 53–65)
NRBC # BLD AUTO: 0 X10E3/UL
NRBC, AUTO (.00): 0 %
NT-PROBNP SERPL-MCNC: 194 PG/ML (ref 1–450)
PLATELET # BLD AUTO: 207 K/UL (ref 150–400)
POTASSIUM SERPL-SCNC: 3.6 MMOL/L (ref 3.5–5.1)
RBC # BLD AUTO: 4.04 M/UL (ref 4.2–5.4)
SODIUM SERPL-SCNC: 135 MMOL/L (ref 136–145)
WBC # BLD AUTO: 5.59 K/UL (ref 4.5–11)

## 2022-10-27 PROCEDURE — 85025 CBC WITH DIFFERENTIAL: ICD-10-PCS | Mod: ,,, | Performed by: CLINICAL MEDICAL LABORATORY

## 2022-10-27 PROCEDURE — 80048 BASIC METABOLIC PANEL: ICD-10-PCS | Mod: ,,, | Performed by: CLINICAL MEDICAL LABORATORY

## 2022-10-27 PROCEDURE — 99214 OFFICE O/P EST MOD 30 MIN: CPT | Mod: ,,, | Performed by: INTERNAL MEDICINE

## 2022-10-27 PROCEDURE — 99214 PR OFFICE/OUTPT VISIT, EST, LEVL IV, 30-39 MIN: ICD-10-PCS | Mod: ,,, | Performed by: INTERNAL MEDICINE

## 2022-10-27 PROCEDURE — 83880 ASSAY OF NATRIURETIC PEPTIDE: CPT | Mod: ,,, | Performed by: CLINICAL MEDICAL LABORATORY

## 2022-10-27 PROCEDURE — 83880 NT-PRO NATRIURETIC PEPTIDE: ICD-10-PCS | Mod: ,,, | Performed by: CLINICAL MEDICAL LABORATORY

## 2022-10-27 PROCEDURE — 71046 X-RAY EXAM CHEST 2 VIEWS: CPT | Mod: TC,RHCUB | Performed by: INTERNAL MEDICINE

## 2022-10-27 PROCEDURE — 85025 COMPLETE CBC W/AUTO DIFF WBC: CPT | Mod: ,,, | Performed by: CLINICAL MEDICAL LABORATORY

## 2022-10-27 PROCEDURE — 80048 BASIC METABOLIC PNL TOTAL CA: CPT | Mod: ,,, | Performed by: CLINICAL MEDICAL LABORATORY

## 2022-10-28 NOTE — PATIENT INSTRUCTIONS
Taina was seen today for hypertension.    Diagnoses and all orders for this visit:    Congestive heart failure, unspecified HF chronicity, unspecified heart failure type  -     Basic Metabolic Panel; Future  -     CBC Auto Differential; Future  -     NT-Pro Natriuretic Peptide; Future  -     X-Ray Chest PA And Lateral; Future  -     Basic Metabolic Panel  -     CBC Auto Differential  -     NT-Pro Natriuretic Peptide    At risk for loss of bone density  -     DXA Bone Density Spine And Hip; Future    Asymptomatic menopausal state  -     DXA Bone Density Spine And Hip; Future    Post-menopausal  -     DXA Bone Density Spine And Hip; Future    Bronchitis    Upper respiratory tract infection, unspecified type    Other orders  The following orders have not been finalized:  -     azithromycin (Z-MORALES) 250 MG tablet  -     benzonatate (TESSALON) 100 MG capsule

## 2022-10-28 NOTE — PROGRESS NOTES
Subjective:       Patient ID: Taina Medrano is a 77 y.o. female.    Chief Complaint: Hypertension (No complaints for today )     Patient is here today for a follow up evaluation. Patient blood pressure is stable today on intake, 120/64. Patient has a complaint of chronic cough. Patient states she has a history of COPD and does experience SOB. Patient states she does cough up yellow phlegm during the morning time. Patient congested on auscultation. Patient denies fever and chills. Will order chest x-ray. Will order z-pack. Will order Tessalon pearls 100mg PO TID PRN for cough #30 RF2. Patient is requesting Dexa Scan due to missed appointment in the past. Will order Dexa Scan. Will order lab work today. Will follow in 6 weeks.     Current Medications:    Current Outpatient Medications:     albuterol (PROVENTIL HFA) 90 mcg/actuation inhaler, Inhale 2 puffs into the lungs every 6 (six) hours as needed for Wheezing. Rescue, Disp: 6.7 g, Rfl: 3    carvediloL (COREG) 25 MG tablet, Take 25 mg by mouth 2 (two) times daily with meals. , Disp: , Rfl:     ENTRESTO  mg per tablet, Take 1 tablet by mouth 2 (two) times daily. , Disp: , Rfl:     furosemide (LASIX) 80 MG tablet, Take 80 mg by mouth once daily. , Disp: , Rfl:     guaiFENesin (MUCINEX) 600 mg 12 hr tablet, Take 1 tablet (600 mg total) by mouth 2 (two) times daily as needed for Congestion., Disp: 20 tablet, Rfl: 1    spironolactone (ALDACTONE) 25 MG tablet, Take 50 mg by mouth once daily. , Disp: , Rfl:     Last Labs:     Office Visit on 10/27/2022   Component Date Value    Sodium 10/27/2022 135 (L)     Potassium 10/27/2022 3.6     Chloride 10/27/2022 103     CO2 10/27/2022 27     Anion Gap 10/27/2022 9     Glucose 10/27/2022 82     BUN 10/27/2022 25 (H)     Creatinine 10/27/2022 1.33 (H)     BUN/Creatinine Ratio 10/27/2022 19     Calcium 10/27/2022 9.9     eGFR 10/27/2022 41 (L)     ProBNP 10/27/2022 194     WBC 10/27/2022 5.59     RBC 10/27/2022 4.04 (L)      Hemoglobin 10/27/2022 12.4     Hematocrit 10/27/2022 38.0     MCV 10/27/2022 94.1     MCH 10/27/2022 30.7     MCHC 10/27/2022 32.6     RDW 10/27/2022 14.1     Platelet Count 10/27/2022 207     MPV 10/27/2022 11.4     Neutrophils % 10/27/2022 46.5 (L)     Lymphocytes % 10/27/2022 40.3     Monocytes % 10/27/2022 8.9 (H)     Eosinophils % 10/27/2022 3.4     Basophils % 10/27/2022 0.7     Immature Granulocytes % 10/27/2022 0.2     nRBC, Auto 10/27/2022 0.0     Neutrophils, Abs 10/27/2022 2.60     Lymphocytes, Absolute 10/27/2022 2.25     Monocytes, Absolute 10/27/2022 0.50     Eosinophils, Absolute 10/27/2022 0.19     Basophils, Absolute 10/27/2022 0.04     Immature Granulocytes, A* 10/27/2022 0.01     nRBC, Absolute 10/27/2022 0.00     Diff Type 10/27/2022 Auto        Last Imaging:  X-Ray Chest PA And Lateral  Narrative: EXAMINATION:  XR CHEST PA AND LATERAL    CLINICAL HISTORY:  Heart failure, unspecified    COMPARISON:  Chest x-ray December 25, 2019    TECHNIQUE:  Frontal and lateral views of the chest.    FINDINGS:  The cardiomediastinal silhouette is stable in configuration.  Cardiac conduction device again demonstrated.  Chronic change of the lungs without focal consolidation, pleural effusion, or pneumothorax.  Visualized osseous and surrounding soft tissue structures appear grossly unchanged.  Impression: No acute cardiopulmonary process demonstrated.    Point of Service: [facility]    Electronically signed by: Santy Tena  Date:    10/27/2022  Time:    17:25         Review of Systems   Respiratory:  Positive for cough.    All other systems reviewed and are negative.      Objective:      Physical Exam  Vitals reviewed.   Constitutional:       Appearance: Normal appearance. She is normal weight.   Cardiovascular:      Rate and Rhythm: Normal rate and regular rhythm.      Pulses: Normal pulses.      Heart sounds: Normal heart sounds.   Pulmonary:      Effort: Pulmonary effort is normal.      Breath  sounds: Normal breath sounds.   Abdominal:      General: Abdomen is flat. Bowel sounds are normal.      Palpations: Abdomen is soft.   Musculoskeletal:         General: Normal range of motion.      Cervical back: Normal range of motion and neck supple.   Skin:     General: Skin is warm and dry.   Neurological:      General: No focal deficit present.      Mental Status: She is alert and oriented to person, place, and time. Mental status is at baseline.       Assessment:       1. Congestive heart failure, unspecified HF chronicity, unspecified heart failure type  Basic Metabolic Panel    CBC Auto Differential    NT-Pro Natriuretic Peptide    X-Ray Chest PA And Lateral    Basic Metabolic Panel    CBC Auto Differential    NT-Pro Natriuretic Peptide      2. At risk for loss of bone density  DXA Bone Density Spine And Hip      3. Asymptomatic menopausal state  DXA Bone Density Spine And Hip      4. Post-menopausal  DXA Bone Density Spine And Hip      5. Bronchitis        6. Upper respiratory tract infection, unspecified type             Plan:         Taina was seen today for hypertension.    Diagnoses and all orders for this visit:    Congestive heart failure, unspecified HF chronicity, unspecified heart failure type  -     Basic Metabolic Panel; Future  -     CBC Auto Differential; Future  -     NT-Pro Natriuretic Peptide; Future  -     X-Ray Chest PA And Lateral; Future  -     Basic Metabolic Panel  -     CBC Auto Differential  -     NT-Pro Natriuretic Peptide    At risk for loss of bone density  -     DXA Bone Density Spine And Hip; Future    Asymptomatic menopausal state  -     DXA Bone Density Spine And Hip; Future    Post-menopausal  -     DXA Bone Density Spine And Hip; Future    Bronchitis    Upper respiratory tract infection, unspecified type    Other orders  The following orders have not been finalized:  -     azithromycin (Z-MORALES) 250 MG tablet  -     benzonatate (TESSALON) 100 MG capsule

## 2022-10-30 RX ORDER — AZITHROMYCIN 250 MG/1
TABLET, FILM COATED ORAL
Qty: 6 TABLET | Refills: 0 | Status: SHIPPED | OUTPATIENT
Start: 2022-10-30 | End: 2023-06-13 | Stop reason: SDUPTHER

## 2022-10-30 RX ORDER — BENZONATATE 100 MG/1
100 CAPSULE ORAL 3 TIMES DAILY PRN
Qty: 30 CAPSULE | Refills: 2 | Status: SHIPPED | OUTPATIENT
Start: 2022-10-30 | End: 2023-02-06 | Stop reason: SDUPTHER

## 2022-12-22 ENCOUNTER — APPOINTMENT (OUTPATIENT)
Dept: RADIOLOGY | Facility: CLINIC | Age: 77
End: 2022-12-22
Attending: INTERNAL MEDICINE
Payer: MEDICARE

## 2022-12-22 ENCOUNTER — OFFICE VISIT (OUTPATIENT)
Dept: FAMILY MEDICINE | Facility: CLINIC | Age: 77
End: 2022-12-22
Payer: MEDICARE

## 2022-12-22 VITALS
HEIGHT: 71 IN | SYSTOLIC BLOOD PRESSURE: 120 MMHG | OXYGEN SATURATION: 95 % | BODY MASS INDEX: 30.49 KG/M2 | TEMPERATURE: 97 F | DIASTOLIC BLOOD PRESSURE: 60 MMHG | RESPIRATION RATE: 18 BRPM | HEART RATE: 86 BPM | WEIGHT: 217.81 LBS

## 2022-12-22 DIAGNOSIS — I50.9 CONGESTIVE HEART FAILURE, UNSPECIFIED HF CHRONICITY, UNSPECIFIED HEART FAILURE TYPE: ICD-10-CM

## 2022-12-22 DIAGNOSIS — I50.9 CONGESTIVE HEART FAILURE, UNSPECIFIED HF CHRONICITY, UNSPECIFIED HEART FAILURE TYPE: Primary | ICD-10-CM

## 2022-12-22 DIAGNOSIS — Z78.0 POSTMENOPAUSAL: ICD-10-CM

## 2022-12-22 PROCEDURE — 99213 OFFICE O/P EST LOW 20 MIN: CPT | Mod: ,,, | Performed by: INTERNAL MEDICINE

## 2022-12-22 PROCEDURE — 90694 VACC AIIV4 NO PRSRV 0.5ML IM: CPT | Mod: ,,, | Performed by: INTERNAL MEDICINE

## 2022-12-22 PROCEDURE — G0008 ADMIN INFLUENZA VIRUS VAC: HCPCS | Mod: ,,, | Performed by: INTERNAL MEDICINE

## 2022-12-22 PROCEDURE — 90694 FLU VACCINE - QUADRIVALENT - ADJUVANTED: ICD-10-PCS | Mod: ,,, | Performed by: INTERNAL MEDICINE

## 2022-12-22 PROCEDURE — 99213 PR OFFICE/OUTPT VISIT, EST, LEVL III, 20-29 MIN: ICD-10-PCS | Mod: ,,, | Performed by: INTERNAL MEDICINE

## 2022-12-22 PROCEDURE — G0008 FLU VACCINE - QUADRIVALENT - ADJUVANTED: ICD-10-PCS | Mod: ,,, | Performed by: INTERNAL MEDICINE

## 2022-12-22 PROCEDURE — 71046 X-RAY EXAM CHEST 2 VIEWS: CPT | Mod: TC,RHCUB | Performed by: INTERNAL MEDICINE

## 2022-12-22 NOTE — PATIENT INSTRUCTIONS
Taina was seen today for congestive heart failure.    Diagnoses and all orders for this visit:    Congestive heart failure, unspecified HF chronicity, unspecified heart failure type  -     X-Ray Chest PA And Lateral; Future    Postmenopausal  -     DXA Bone Density Spine And Hip; Future    Other orders  -     Influenza - Quadrivalent (Adjuvanted)

## 2022-12-22 NOTE — PROGRESS NOTES
Subjective:       Patient ID: Taina Medrano is a 77 y.o. female.    Chief Complaint: Congestive Heart Failure (Follow up )    Patient is here today for a follow up evaluation. Patient blood pressure is stable today on intake, 120/60. Patient has a history of CHF. Patient states she does experience SOB when she does certain activities. Patient has a complaint of congestion. Will order chest x-ray. Health maintenance discussed with patient. Will give patient flu shot today. Will order Dexa Scan. Will follow with patient in 1 month.     Current Medications:    Current Outpatient Medications:     albuterol (PROVENTIL HFA) 90 mcg/actuation inhaler, Inhale 2 puffs into the lungs every 6 (six) hours as needed for Wheezing. Rescue, Disp: 6.7 g, Rfl: 3    benzonatate (TESSALON) 100 MG capsule, Take 1 capsule (100 mg total) by mouth 3 (three) times daily as needed for Cough., Disp: 30 capsule, Rfl: 2    carvediloL (COREG) 25 MG tablet, Take 25 mg by mouth 2 (two) times daily with meals. , Disp: , Rfl:     ENTRESTO  mg per tablet, Take 1 tablet by mouth 2 (two) times daily. , Disp: , Rfl:     furosemide (LASIX) 80 MG tablet, Take 80 mg by mouth once daily. , Disp: , Rfl:     guaiFENesin (MUCINEX) 600 mg 12 hr tablet, Take 1 tablet (600 mg total) by mouth 2 (two) times daily as needed for Congestion., Disp: 20 tablet, Rfl: 1    spironolactone (ALDACTONE) 25 MG tablet, Take 50 mg by mouth once daily. , Disp: , Rfl:     azithromycin (Z-MORALES) 250 MG tablet, Take 2 tablets by mouth on day 1; Take 1 tablet by mouth on days 2-5 (Patient not taking: Reported on 12/22/2022), Disp: 6 tablet, Rfl: 0    Last Labs:     No visits with results within 1 Month(s) from this visit.   Latest known visit with results is:   Office Visit on 10/27/2022   Component Date Value    Sodium 10/27/2022 135 (L)     Potassium 10/27/2022 3.6     Chloride 10/27/2022 103     CO2 10/27/2022 27     Anion Gap 10/27/2022 9     Glucose 10/27/2022 82     BUN  10/27/2022 25 (H)     Creatinine 10/27/2022 1.33 (H)     BUN/Creatinine Ratio 10/27/2022 19     Calcium 10/27/2022 9.9     eGFR 10/27/2022 41 (L)     ProBNP 10/27/2022 194     WBC 10/27/2022 5.59     RBC 10/27/2022 4.04 (L)     Hemoglobin 10/27/2022 12.4     Hematocrit 10/27/2022 38.0     MCV 10/27/2022 94.1     MCH 10/27/2022 30.7     MCHC 10/27/2022 32.6     RDW 10/27/2022 14.1     Platelet Count 10/27/2022 207     MPV 10/27/2022 11.4     Neutrophils % 10/27/2022 46.5 (L)     Lymphocytes % 10/27/2022 40.3     Monocytes % 10/27/2022 8.9 (H)     Eosinophils % 10/27/2022 3.4     Basophils % 10/27/2022 0.7     Immature Granulocytes % 10/27/2022 0.2     nRBC, Auto 10/27/2022 0.0     Neutrophils, Abs 10/27/2022 2.60     Lymphocytes, Absolute 10/27/2022 2.25     Monocytes, Absolute 10/27/2022 0.50     Eosinophils, Absolute 10/27/2022 0.19     Basophils, Absolute 10/27/2022 0.04     Immature Granulocytes, A* 10/27/2022 0.01     nRBC, Absolute 10/27/2022 0.00     Diff Type 10/27/2022 Auto        Last Imaging:  X-Ray Chest PA And Lateral  Narrative: EXAMINATION:  XR CHEST PA AND LATERAL    CLINICAL HISTORY:  .  Heart failure, unspecified    COMPARISON:  October 27, 2022    TECHNIQUE:  PA and lateral views of the chest    FINDINGS:  The cardiac silhouette is not enlarged.  Mediastinal contours are unchanged.  There is mild to moderate aortic arch calcification.  There is no pulmonary vascular engorgement.    Lungs and pleural spaces are clear.    Left subclavian multiple lead pacemaker/defibrillator device is generally intact and unchanged.    Osseous structures are unchanged  Impression: No acute cardiopulmonary process compared to the previous study    Electronically signed by: Carlito Luis  Date:    12/22/2022  Time:    12:25         Review of Systems   HENT:  Positive for sinus pressure/congestion.    All other systems reviewed and are negative.      Objective:      Physical Exam  Vitals reviewed.   Constitutional:        Appearance: Normal appearance. She is normal weight.   Cardiovascular:      Rate and Rhythm: Normal rate and regular rhythm.      Pulses: Normal pulses.      Heart sounds: Normal heart sounds.   Pulmonary:      Effort: Pulmonary effort is normal.      Breath sounds: Normal breath sounds.   Abdominal:      General: Abdomen is flat. Bowel sounds are normal.      Palpations: Abdomen is soft.   Musculoskeletal:         General: Normal range of motion.      Cervical back: Normal range of motion and neck supple.   Skin:     General: Skin is warm and dry.   Neurological:      General: No focal deficit present.      Mental Status: She is alert and oriented to person, place, and time. Mental status is at baseline.       Assessment:       1. Congestive heart failure, unspecified HF chronicity, unspecified heart failure type  X-Ray Chest PA And Lateral      2. Postmenopausal  DXA Bone Density Spine And Hip           Plan:         Taina was seen today for congestive heart failure.    Diagnoses and all orders for this visit:    Congestive heart failure, unspecified HF chronicity, unspecified heart failure type  -     X-Ray Chest PA And Lateral; Future    Postmenopausal  -     DXA Bone Density Spine And Hip; Future    Other orders  -     Influenza - Quadrivalent (Adjuvanted)

## 2023-02-06 ENCOUNTER — OFFICE VISIT (OUTPATIENT)
Dept: FAMILY MEDICINE | Facility: CLINIC | Age: 78
End: 2023-02-06
Payer: MEDICARE

## 2023-02-06 VITALS
WEIGHT: 220 LBS | HEIGHT: 71 IN | DIASTOLIC BLOOD PRESSURE: 61 MMHG | RESPIRATION RATE: 18 BRPM | SYSTOLIC BLOOD PRESSURE: 111 MMHG | HEART RATE: 71 BPM | OXYGEN SATURATION: 98 % | BODY MASS INDEX: 30.8 KG/M2 | TEMPERATURE: 98 F

## 2023-02-06 DIAGNOSIS — R05.3 CHRONIC COUGH: Primary | Chronic | ICD-10-CM

## 2023-02-06 DIAGNOSIS — I50.9 CHRONIC CONGESTIVE HEART FAILURE, UNSPECIFIED HEART FAILURE TYPE: Chronic | ICD-10-CM

## 2023-02-06 PROCEDURE — G0009 PNEUMOCOCCAL CONJUGATE VACCINE 20-VALENT: ICD-10-PCS | Mod: ,,, | Performed by: INTERNAL MEDICINE

## 2023-02-06 PROCEDURE — G0009 ADMIN PNEUMOCOCCAL VACCINE: HCPCS | Mod: ,,, | Performed by: INTERNAL MEDICINE

## 2023-02-06 PROCEDURE — 90677 PNEUMOCOCCAL CONJUGATE VACCINE 20-VALENT: ICD-10-PCS | Mod: ,,, | Performed by: INTERNAL MEDICINE

## 2023-02-06 PROCEDURE — 99214 PR OFFICE/OUTPT VISIT, EST, LEVL IV, 30-39 MIN: ICD-10-PCS | Mod: ,,, | Performed by: INTERNAL MEDICINE

## 2023-02-06 PROCEDURE — 90677 PCV20 VACCINE IM: CPT | Mod: ,,, | Performed by: INTERNAL MEDICINE

## 2023-02-06 PROCEDURE — 99214 OFFICE O/P EST MOD 30 MIN: CPT | Mod: ,,, | Performed by: INTERNAL MEDICINE

## 2023-02-07 PROBLEM — R05.3 CHRONIC COUGH: Chronic | Status: ACTIVE | Noted: 2023-02-07

## 2023-02-07 PROBLEM — I50.9 CHRONIC CONGESTIVE HEART FAILURE: Chronic | Status: ACTIVE | Noted: 2023-02-07

## 2023-02-07 RX ORDER — BENZONATATE 100 MG/1
100 CAPSULE ORAL 3 TIMES DAILY PRN
Qty: 30 CAPSULE | Refills: 2 | Status: SHIPPED | OUTPATIENT
Start: 2023-02-07 | End: 2023-10-24 | Stop reason: ALTCHOICE

## 2023-02-07 NOTE — PROGRESS NOTES
"Subjective:       Patient ID: Taina Medrano is a 77 y.o. female.    Chief Complaint: Follow-up (Congestive heart failure )    HPI  Patient states that she has had a chronic cough for several months.  States that the cough is getting worse.  She denies chest pain she denies shortness of breath she denies fever and chills.    Patient is requesting a pneumo vaccine    Current Medications:    Current Outpatient Medications:     albuterol (PROVENTIL HFA) 90 mcg/actuation inhaler, Inhale 2 puffs into the lungs every 6 (six) hours as needed for Wheezing. Rescue, Disp: 6.7 g, Rfl: 3    carvediloL (COREG) 25 MG tablet, Take 25 mg by mouth 2 (two) times daily with meals. , Disp: , Rfl:     ENTRESTO  mg per tablet, Take 1 tablet by mouth 2 (two) times daily. , Disp: , Rfl:     furosemide (LASIX) 80 MG tablet, Take 80 mg by mouth once daily. , Disp: , Rfl:     guaiFENesin (MUCINEX) 600 mg 12 hr tablet, Take 1 tablet (600 mg total) by mouth 2 (two) times daily as needed for Congestion., Disp: 20 tablet, Rfl: 1    spironolactone (ALDACTONE) 25 MG tablet, Take 50 mg by mouth once daily. , Disp: , Rfl:     azithromycin (Z-MORALES) 250 MG tablet, Take 2 tablets by mouth on day 1; Take 1 tablet by mouth on days 2-5 (Patient not taking: Reported on 12/22/2022), Disp: 6 tablet, Rfl: 0    benzonatate (TESSALON) 100 MG capsule, Take 1 capsule (100 mg total) by mouth 3 (three) times daily as needed for Cough., Disp: 30 capsule, Rfl: 2           Review of Systems   Respiratory:  Positive for cough.               Vitals:    02/06/23 1602   BP: 111/61   BP Location: Right arm   Pulse: 71   Resp: 18   Temp: 97.5 °F (36.4 °C)   SpO2: 98%   Weight: 99.8 kg (220 lb)   Height: 5' 11" (1.803 m)        Physical Exam  Vitals and nursing note reviewed.   Constitutional:       Appearance: Normal appearance.   Cardiovascular:      Rate and Rhythm: Normal rate and regular rhythm.      Pulses: Normal pulses.      Heart sounds: Normal heart sounds. "   Pulmonary:      Effort: Pulmonary effort is normal.      Breath sounds: Rhonchi present.   Abdominal:      General: Abdomen is flat. Bowel sounds are normal.      Palpations: Abdomen is soft.   Musculoskeletal:         General: Normal range of motion.   Skin:     General: Skin is warm and dry.   Neurological:      General: No focal deficit present.      Mental Status: She is alert and oriented to person, place, and time. Mental status is at baseline.         Last Labs:     No visits with results within 1 Month(s) from this visit.   Latest known visit with results is:   Office Visit on 10/27/2022   Component Date Value    Sodium 10/27/2022 135 (L)     Potassium 10/27/2022 3.6     Chloride 10/27/2022 103     CO2 10/27/2022 27     Anion Gap 10/27/2022 9     Glucose 10/27/2022 82     BUN 10/27/2022 25 (H)     Creatinine 10/27/2022 1.33 (H)     BUN/Creatinine Ratio 10/27/2022 19     Calcium 10/27/2022 9.9     eGFR 10/27/2022 41 (L)     ProBNP 10/27/2022 194     WBC 10/27/2022 5.59     RBC 10/27/2022 4.04 (L)     Hemoglobin 10/27/2022 12.4     Hematocrit 10/27/2022 38.0     MCV 10/27/2022 94.1     MCH 10/27/2022 30.7     MCHC 10/27/2022 32.6     RDW 10/27/2022 14.1     Platelet Count 10/27/2022 207     MPV 10/27/2022 11.4     Neutrophils % 10/27/2022 46.5 (L)     Lymphocytes % 10/27/2022 40.3     Monocytes % 10/27/2022 8.9 (H)     Eosinophils % 10/27/2022 3.4     Basophils % 10/27/2022 0.7     Immature Granulocytes % 10/27/2022 0.2     nRBC, Auto 10/27/2022 0.0     Neutrophils, Abs 10/27/2022 2.60     Lymphocytes, Absolute 10/27/2022 2.25     Monocytes, Absolute 10/27/2022 0.50     Eosinophils, Absolute 10/27/2022 0.19     Basophils, Absolute 10/27/2022 0.04     Immature Granulocytes, A* 10/27/2022 0.01     nRBC, Absolute 10/27/2022 0.00     Diff Type 10/27/2022 Auto        Last Imaging:  X-Ray Chest PA And Lateral  Narrative: EXAMINATION:  XR CHEST PA AND LATERAL    CLINICAL HISTORY:  .  Heart failure,  unspecified    COMPARISON:  October 27, 2022    TECHNIQUE:  PA and lateral views of the chest    FINDINGS:  The cardiac silhouette is not enlarged.  Mediastinal contours are unchanged.  There is mild to moderate aortic arch calcification.  There is no pulmonary vascular engorgement.    Lungs and pleural spaces are clear.    Left subclavian multiple lead pacemaker/defibrillator device is generally intact and unchanged.    Osseous structures are unchanged  Impression: No acute cardiopulmonary process compared to the previous study    Electronically signed by: Carlito Luis  Date:    12/22/2022  Time:    12:25         **Labs and x-rays personally reviewed by me    ** reviewed      Objective:        Assessment:       1. Chronic cough      Tessalon Perles 100 mg p.o. 3 times a day      2. Chronic congestive heart failure, unspecified heart failure type      Continue Entresto           Plan:         [unfilled]

## 2023-05-09 DIAGNOSIS — Z71.89 COMPLEX CARE COORDINATION: ICD-10-CM

## 2023-05-26 NOTE — PROGRESS NOTES
[facility]     PATIENT NAME: Taina Medrano   : 1945    AGE: 78 y.o. DATE: 2023   MRN: [id number]        Reason for Visit / Chief Complaint: Medicare AWV (Subsequent Medicare AWV visit )        Taina Medrano presents for a Subsequent Medicare AWV today. She is an established pt of dr. Monk. The pt reports no concerns at this time. She does report wearing compression hose for bilateral lower extremity edema. Has chronic illnesses of Hypertension, Chronic congestive heart failure, asthma, and  hx of breast cancer.    Review of Systems   Constitutional:  Negative for chills, fever and malaise/fatigue.   HENT:  Negative for congestion, hearing loss and sore throat.    Eyes:  Negative for blurred vision and pain.   Respiratory:  Negative for cough and shortness of breath.    Cardiovascular:  Positive for leg swelling. Negative for chest pain.   Gastrointestinal:  Negative for abdominal pain and nausea.   Genitourinary:  Negative for dysuria.   Skin:  Negative for itching and rash.      The following components were reviewed and updated:      Medical/Social/Family History:  Past Medical History:   Diagnosis Date    Asthma     Breast cancer     Cancer     MI, old         Family History   Problem Relation Age of Onset    Cancer Sister     Cancer Brother     Hypertension Brother         Past Surgical History:   Procedure Laterality Date    BREAST LUMPECTOMY Right     BREAST SURGERY      SINUS SURGERY         Social History     Tobacco Use   Smoking Status Never    Passive exposure: Never   Smokeless Tobacco Never       Social History     Substance and Sexual Activity   Alcohol Use Never         Allergies and Current Medications   Review of patient's allergies indicates:  No Known Allergies    Current Outpatient Medications:     albuterol (PROVENTIL HFA) 90 mcg/actuation inhaler, Inhale 2 puffs into the lungs every 6 (six) hours as needed for Wheezing. Rescue, Disp: 6.7 g, Rfl:  3    benzonatate (TESSALON) 100 MG capsule, Take 1 capsule (100 mg total) by mouth 3 (three) times daily as needed for Cough., Disp: 30 capsule, Rfl: 2    carvediloL (COREG) 25 MG tablet, Take 25 mg by mouth 2 (two) times daily with meals. , Disp: , Rfl:     ENTRESTO  mg per tablet, Take 1 tablet by mouth 2 (two) times daily. , Disp: , Rfl:     furosemide (LASIX) 80 MG tablet, Take 80 mg by mouth once daily. , Disp: , Rfl:     spironolactone (ALDACTONE) 25 MG tablet, Take 50 mg by mouth once daily. , Disp: , Rfl:     azithromycin (Z-MORALES) 250 MG tablet, Take 2 tablets by mouth on day 1; Take 1 tablet by mouth on days 2-5 (Patient not taking: Reported on 12/22/2022), Disp: 6 tablet, Rfl: 0    guaiFENesin (MUCINEX) 600 mg 12 hr tablet, Take 1 tablet (600 mg total) by mouth 2 (two) times daily as needed for Congestion. (Patient not taking: Reported on 6/2/2023), Disp: 20 tablet, Rfl: 1      Health Risk Assessment   Fall Risk: not at risk    Obesity: BMI Body mass index is 31.24 kg/m².   Advance Directive: no but information given   Depression: PHQ9- 0   HTN: DASH diet, exercise, weight management, med compliance, home BP monitoring, and follow-up discussed.   T2DM: no  STI: not at risk   Statin Use: no      Health Maintenance   Last eye exam: saw Dr. Medrano 2022   Last CV screen with lipids: drawn this visit   Diabetes screening with fasting glucose or A1c: 10/27/22   Colonoscopy: cologuard ordered this visit   Flu Vaccine: 12/22/22   Pneumonia vaccines: 2/6/23   COVID vaccine: (moderna) 5/13/22, 11/20/21, 3/1/21, 1/22/21   Hep B vaccine: na   DEXA: ordered 12/12/22   Last pap/pelvic: ordered this visit   Last Mammogram: 7/13/22  AAA screening: na   HIV Screening: not at risk  Hepatitis C Screen: 5/11/22 non-reactive  Low Dose CT Scan: na    Health Maintenance Topics with due status: Not Due       Topic Last Completion Date    Lipid Panel 05/11/2022     Health Maintenance Due   Topic Date Due    TETANUS VACCINE   Never done    Shingles Vaccine (1 of 2) Never done    DEXA Scan  12/07/2020    COVID-19 Vaccine (5 - Moderna series) 07/08/2022         Lab results available in Epic or see dates from [facility] above:   Lab Results   Component Value Date    CHOL 179 05/11/2022     Lab Results   Component Value Date    HDL 58 05/11/2022     Lab Results   Component Value Date    LDLCALC 102 05/11/2022     Lab Results   Component Value Date    TRIG 94 05/11/2022     Lab Results   Component Value Date    CHOLHDL 3.1 05/11/2022       No results found for: LABA1C, HGBA1C    Sodium   Date Value Ref Range Status   10/27/2022 135 (L) 136 - 145 mmol/L Final     Potassium   Date Value Ref Range Status   10/27/2022 3.6 3.5 - 5.1 mmol/L Final     Chloride   Date Value Ref Range Status   10/27/2022 103 98 - 107 mmol/L Final     CO2   Date Value Ref Range Status   10/27/2022 27 21 - 32 mmol/L Final     Glucose   Date Value Ref Range Status   10/27/2022 82 74 - 106 mg/dL Final     BUN   Date Value Ref Range Status   10/27/2022 25 (H) 7 - 18 mg/dL Final     Creatinine   Date Value Ref Range Status   10/27/2022 1.33 (H) 0.55 - 1.02 mg/dL Final     Calcium   Date Value Ref Range Status   10/27/2022 9.9 8.5 - 10.1 mg/dL Final     Anion Gap   Date Value Ref Range Status   10/27/2022 9 7 - 16 mmol/L Final     eGFR    Date Value Ref Range Status   10/25/2021 46 (L) >=60 mL/min/1.73m² Final     eGFR   Date Value Ref Range Status   02/21/2022 33 (L) >=60 mL/min/1.73m² Final         Incontinence  Bowel: no  Bladder: uses pads      Care Team  Dr. Monk -PCP                 Dr. Stovall - ophthalomogy                 Dr. Meraz-cardiology    **See Completed Assessments for Annual Wellness visit within the encounter summary    The following assessments were completed & reviewed:  Depression Screening  Cognitive function Screening  Timed Get Up Test  Whisper Test  Vision Screen  Health Risk Assessment  Checklist of ADLs and  "IADLs        Objective  Vitals:    23 0840   BP: 110/64   Pulse: 74   Resp: 16   Temp: 97.9 °F (36.6 °C)   TempSrc: Oral   SpO2: 97%   Weight: 101.6 kg (224 lb)   Height: 5' 11" (1.803 m)   PainSc: 0-No pain      Body mass index is 31.24 kg/m².  Ideal body weight: 70.8 kg (156 lb 1.4 oz)       Physical Exam  Constitutional:       General: She is not in acute distress.     Appearance: Normal appearance. She is obese.   HENT:      Head: Normocephalic.      Mouth/Throat:      Mouth: Mucous membranes are moist.   Cardiovascular:      Rate and Rhythm: Normal rate and regular rhythm.      Pulses: Normal pulses.           Radial pulses are 2+ on the right side and 2+ on the left side.      Heart sounds: Normal heart sounds. No murmur heard.  Pulmonary:      Effort: Pulmonary effort is normal. No respiratory distress.      Breath sounds: Normal breath sounds.   Abdominal:      Palpations: Abdomen is soft.      Tenderness: There is no abdominal tenderness.   Musculoskeletal:         General: Normal range of motion.      Cervical back: Neck supple.      Right lower le+ Pitting Edema present.      Left lower le+ Pitting Edema present.   Skin:     General: Skin is warm and dry.   Neurological:      Mental Status: She is alert and oriented to person, place, and time.   Psychiatric:         Mood and Affect: Mood normal.         Behavior: Behavior normal.       Assessment:     1. Encounter for subsequent annual wellness visit (AWV) in Medicare patient    2. Primary hypertension    3. Hyperlipidemia, unspecified hyperlipidemia type    4. Chronic systolic congestive heart failure    5. Stage 2 chronic kidney disease    6. Venous insufficiency    7. Presence of implantable cardioverter-defibrillator (ICD)    8. Screening for colon cancer    9. Screening for cervical cancer    10. Need for shingles vaccine    11. Need for tetanus, diphtheria, and acellular pertussis (Tdap) vaccine    12. BMI 31.0-31.9,adult   "       Plan:    Referrals:   GYN for pap test and pelvic exam  Cologuard for colon screening      Advised to call office if does not hear from anyone with referral appt within 2-3 weeks to check on status of referral. Voiced understanding.      Discussed and provided with a screening schedule and personal prevention plan in accordance with USPSTF age appropriate recommendations and Medicare screening guidelines.   Education, counseling, and referrals were provided as needed.  After Visit Summary printed and given to patient which includes written education and a list of any referrals if indicated.     Education including diet, exercise, falls, preventive health care for older adults, BMI and advanced directives discussed with patient and patient verbalized understanding.      F/u plan for yearly AWV.    Signature: Jessica ESTRADA-BC

## 2023-06-02 ENCOUNTER — OFFICE VISIT (OUTPATIENT)
Dept: FAMILY MEDICINE | Facility: CLINIC | Age: 78
End: 2023-06-02
Payer: MEDICARE

## 2023-06-02 VITALS
DIASTOLIC BLOOD PRESSURE: 64 MMHG | TEMPERATURE: 98 F | BODY MASS INDEX: 31.36 KG/M2 | HEIGHT: 71 IN | SYSTOLIC BLOOD PRESSURE: 110 MMHG | RESPIRATION RATE: 16 BRPM | HEART RATE: 74 BPM | WEIGHT: 224 LBS | OXYGEN SATURATION: 97 %

## 2023-06-02 DIAGNOSIS — I87.2 VENOUS INSUFFICIENCY: ICD-10-CM

## 2023-06-02 DIAGNOSIS — N18.2 STAGE 2 CHRONIC KIDNEY DISEASE: ICD-10-CM

## 2023-06-02 DIAGNOSIS — Z00.00 ENCOUNTER FOR SUBSEQUENT ANNUAL WELLNESS VISIT (AWV) IN MEDICARE PATIENT: Primary | ICD-10-CM

## 2023-06-02 DIAGNOSIS — Z95.810 PRESENCE OF IMPLANTABLE CARDIOVERTER-DEFIBRILLATOR (ICD): ICD-10-CM

## 2023-06-02 DIAGNOSIS — Z23 NEED FOR TETANUS, DIPHTHERIA, AND ACELLULAR PERTUSSIS (TDAP) VACCINE: ICD-10-CM

## 2023-06-02 DIAGNOSIS — I10 PRIMARY HYPERTENSION: ICD-10-CM

## 2023-06-02 DIAGNOSIS — I50.22 CHRONIC SYSTOLIC CONGESTIVE HEART FAILURE: Chronic | ICD-10-CM

## 2023-06-02 DIAGNOSIS — E78.5 HYPERLIPIDEMIA, UNSPECIFIED HYPERLIPIDEMIA TYPE: ICD-10-CM

## 2023-06-02 DIAGNOSIS — Z12.11 SCREENING FOR COLON CANCER: ICD-10-CM

## 2023-06-02 DIAGNOSIS — Z23 NEED FOR SHINGLES VACCINE: ICD-10-CM

## 2023-06-02 DIAGNOSIS — Z12.4 SCREENING FOR CERVICAL CANCER: ICD-10-CM

## 2023-06-02 LAB
CHOLEST SERPL-MCNC: 168 MG/DL (ref 0–200)
CHOLEST/HDLC SERPL: 2.8 {RATIO}
HDLC SERPL-MCNC: 61 MG/DL (ref 40–60)
LDLC SERPL CALC-MCNC: 88 MG/DL
LDLC/HDLC SERPL: 1.4 {RATIO}
NONHDLC SERPL-MCNC: 107 MG/DL
TRIGL SERPL-MCNC: 96 MG/DL (ref 35–150)
VLDLC SERPL-MCNC: 19 MG/DL

## 2023-06-02 PROCEDURE — G0439 PR MEDICARE ANNUAL WELLNESS SUBSEQUENT VISIT: ICD-10-PCS | Mod: ,,, | Performed by: NURSE PRACTITIONER

## 2023-06-02 PROCEDURE — 80061 LIPID PANEL: ICD-10-PCS | Mod: ,,, | Performed by: CLINICAL MEDICAL LABORATORY

## 2023-06-02 PROCEDURE — G0439 PPPS, SUBSEQ VISIT: HCPCS | Mod: ,,, | Performed by: NURSE PRACTITIONER

## 2023-06-02 PROCEDURE — 80061 LIPID PANEL: CPT | Mod: ,,, | Performed by: CLINICAL MEDICAL LABORATORY

## 2023-06-02 RX ORDER — ZOSTER VACCINE RECOMBINANT, ADJUVANTED 50 MCG/0.5
0.5 KIT INTRAMUSCULAR ONCE
Qty: 1 EACH | Refills: 0 | Status: SHIPPED | OUTPATIENT
Start: 2023-06-02 | End: 2023-06-02

## 2023-06-02 NOTE — PATIENT INSTRUCTIONS
Counseling and Referral of Other Preventative  (Italic type indicates deductible and co-insurance are waived)    Patient Name: Taina Medrano  Today's Date: 6/2/2023    Health Maintenance         Date Due Completion Date    TETANUS VACCINE Never done ---order sent to pharmacy    Shingles Vaccine (1 of 2) Never done ---order sent to pharmacy    DEXA Scan 12/07/2020 12/7/2017-ordered 12/12/22    COVID-19 Vaccine (5 - Moderna series) 07/08/2022 5/13/2022    Lipid Panel 05/11/2027 5/11/2022-ordered this visit          No orders of the defined types were placed in this encounter.

## 2023-06-13 ENCOUNTER — OFFICE VISIT (OUTPATIENT)
Dept: FAMILY MEDICINE | Facility: CLINIC | Age: 78
End: 2023-06-13
Payer: MEDICARE

## 2023-06-13 ENCOUNTER — APPOINTMENT (OUTPATIENT)
Dept: RADIOLOGY | Facility: CLINIC | Age: 78
End: 2023-06-13
Attending: INTERNAL MEDICINE
Payer: MEDICARE

## 2023-06-13 VITALS
HEIGHT: 71 IN | RESPIRATION RATE: 18 BRPM | TEMPERATURE: 98 F | WEIGHT: 224.63 LBS | DIASTOLIC BLOOD PRESSURE: 77 MMHG | OXYGEN SATURATION: 96 % | HEART RATE: 80 BPM | BODY MASS INDEX: 31.45 KG/M2 | SYSTOLIC BLOOD PRESSURE: 130 MMHG

## 2023-06-13 DIAGNOSIS — E66.9 CLASS 1 OBESITY WITH BODY MASS INDEX (BMI) OF 31.0 TO 31.9 IN ADULT, UNSPECIFIED OBESITY TYPE, UNSPECIFIED WHETHER SERIOUS COMORBIDITY PRESENT: ICD-10-CM

## 2023-06-13 DIAGNOSIS — M25.512 LEFT SHOULDER PAIN, UNSPECIFIED CHRONICITY: ICD-10-CM

## 2023-06-13 DIAGNOSIS — R60.0 LEG EDEMA, LEFT: ICD-10-CM

## 2023-06-13 DIAGNOSIS — I50.9 CONGESTIVE HEART FAILURE, UNSPECIFIED HF CHRONICITY, UNSPECIFIED HEART FAILURE TYPE: ICD-10-CM

## 2023-06-13 DIAGNOSIS — M17.12 OSTEOARTHRITIS OF LEFT KNEE, UNSPECIFIED OSTEOARTHRITIS TYPE: Primary | ICD-10-CM

## 2023-06-13 PROCEDURE — 99214 OFFICE O/P EST MOD 30 MIN: CPT | Mod: ,,, | Performed by: INTERNAL MEDICINE

## 2023-06-13 PROCEDURE — 71046 X-RAY EXAM CHEST 2 VIEWS: CPT | Mod: TC,RHCUB | Performed by: INTERNAL MEDICINE

## 2023-06-13 PROCEDURE — 99214 PR OFFICE/OUTPT VISIT, EST, LEVL IV, 30-39 MIN: ICD-10-PCS | Mod: ,,, | Performed by: INTERNAL MEDICINE

## 2023-06-13 NOTE — PROGRESS NOTES
Care gaps discussed with patient. Patient is due for tetanus and shingles vaccine, due to patient insurance patient will need to receive vaccines at pharmacy. Patient dexa scan has been scheduled.

## 2023-06-16 RX ORDER — BENZONATATE 200 MG/1
200 CAPSULE ORAL 2 TIMES DAILY
Qty: 60 CAPSULE | Refills: 0 | Status: SHIPPED | OUTPATIENT
Start: 2023-06-16 | End: 2023-08-03 | Stop reason: SDUPTHER

## 2023-06-16 RX ORDER — AZITHROMYCIN 250 MG/1
TABLET, FILM COATED ORAL
Qty: 6 TABLET | Refills: 1 | Status: SHIPPED | OUTPATIENT
Start: 2023-06-16 | End: 2023-10-24 | Stop reason: ALTCHOICE

## 2023-06-18 PROBLEM — R60.0 LEG EDEMA, LEFT: Status: ACTIVE | Noted: 2023-06-18

## 2023-06-18 PROBLEM — M25.512 LEFT SHOULDER PAIN: Status: ACTIVE | Noted: 2023-06-18

## 2023-06-18 PROBLEM — E66.9 CLASS 1 OBESITY WITH BODY MASS INDEX (BMI) OF 31.0 TO 31.9 IN ADULT: Status: ACTIVE | Noted: 2023-06-18

## 2023-06-18 PROBLEM — I50.9 CONGESTIVE HEART FAILURE: Status: ACTIVE | Noted: 2023-02-07

## 2023-06-18 PROBLEM — M17.12 OSTEOARTHRITIS OF LEFT KNEE: Status: ACTIVE | Noted: 2023-06-18

## 2023-06-18 PROBLEM — E66.811 CLASS 1 OBESITY WITH BODY MASS INDEX (BMI) OF 31.0 TO 31.9 IN ADULT: Status: ACTIVE | Noted: 2023-06-18

## 2023-06-19 NOTE — PROGRESS NOTES
Subjective:       Patient ID: Taina Medrano is a 78 y.o. female.    Chief Complaint: Cyst (On back of left leg )  78-year-old nice lady presents complaining of pain in the left lower extremity.  Stated she was told that she has a cyst to the left lower extremity.  Eye exam left lower extremity she does have swelling and the left popliteal fossa in tenderness.    Patient complains of mild shoulder pain on the left   She complains of chest pain and shortness of breath.    She has a history of CHF question no Baker cyst she complains of left leg swelling and also she has OA of the left knee.    The plan refer to Orthopedics, occupational therapy for the shoulder discomfort.    Venous Doppler of the left lower extremity to evaluate for a Baker cyst.    She has been short of breath I will order PA lateral chest x-ray and high-resolution CT scan.    X-ray left showed an x-ray the left knee.    She complains of a cough with yellowish-greenish sputum does been going on for approximately 1-2 weeks.    The plan Z-Nando and Tessalon Perles 200 mg 1 p.o. b.i.d..  Cyst  Associated symptoms include arthralgias. Pertinent negatives include no abdominal pain, chest pain, fatigue or fever.   .    Current Medications:    Current Outpatient Medications:     albuterol (PROVENTIL HFA) 90 mcg/actuation inhaler, Inhale 2 puffs into the lungs every 6 (six) hours as needed for Wheezing. Rescue, Disp: 6.7 g, Rfl: 3    benzonatate (TESSALON) 100 MG capsule, Take 1 capsule (100 mg total) by mouth 3 (three) times daily as needed for Cough., Disp: 30 capsule, Rfl: 2    carvediloL (COREG) 25 MG tablet, Take 25 mg by mouth 2 (two) times daily with meals. , Disp: , Rfl:     ENTRESTO  mg per tablet, Take 1 tablet by mouth 2 (two) times daily. , Disp: , Rfl:     furosemide (LASIX) 80 MG tablet, Take 80 mg by mouth once daily. , Disp: , Rfl:     spironolactone (ALDACTONE) 25 MG tablet, Take 50 mg by mouth once daily. , Disp: , Rfl:     azithromycin  "(Z-MORALES) 250 MG tablet, Take 2 tablets by mouth on day 1; Take 1 tablet by mouth on days 2-5, Disp: 6 tablet, Rfl: 1    benzonatate (TESSALON) 200 MG capsule, Take 1 capsule (200 mg total) by mouth 2 (two) times a day., Disp: 60 capsule, Rfl: 0    guaiFENesin (MUCINEX) 600 mg 12 hr tablet, Take 1 tablet (600 mg total) by mouth 2 (two) times daily as needed for Congestion. (Patient not taking: Reported on 6/2/2023), Disp: 20 tablet, Rfl: 1           Review of Systems   Constitutional:  Negative for appetite change, fatigue and fever.   Respiratory:  Negative for shortness of breath.    Cardiovascular:  Negative for chest pain.   Gastrointestinal:  Negative for abdominal pain and constipation.   Endocrine: Negative for polydipsia, polyphagia and polyuria.   Genitourinary:  Negative for difficulty urinating, frequency and hot flashes.   Musculoskeletal:  Positive for arthralgias and leg pain.   Allergic/Immunologic: Negative for environmental allergies.   Neurological:  Negative for dizziness and light-headedness.   Psychiatric/Behavioral:  Negative for agitation.               Vitals:    06/13/23 1531   BP: 130/77   BP Location: Right arm   Patient Position: Sitting   BP Method: Large (Automatic)   Pulse: 80   Resp: 18   Temp: 97.6 °F (36.4 °C)   TempSrc: Temporal   SpO2: 96%   Weight: 101.9 kg (224 lb 9.6 oz)   Height: 5' 11" (1.803 m)        Physical Exam  Vitals and nursing note reviewed.   Constitutional:       Appearance: Normal appearance.   Cardiovascular:      Rate and Rhythm: Normal rate and regular rhythm.      Pulses: Normal pulses.      Heart sounds: Normal heart sounds.   Pulmonary:      Effort: Pulmonary effort is normal.      Breath sounds: Normal breath sounds.   Abdominal:      General: Abdomen is flat. Bowel sounds are normal.      Palpations: Abdomen is soft.   Musculoskeletal:         General: Normal range of motion.   Skin:     General: Skin is warm and dry.   Neurological:      General: No focal " deficit present.      Mental Status: She is alert and oriented to person, place, and time. Mental status is at baseline.         Last Labs:     Office Visit on 06/02/2023   Component Date Value    Triglycerides 06/02/2023 96     Cholesterol 06/02/2023 168     HDL Cholesterol 06/02/2023 61 (H)     Cholesterol/HDL Ratio (R* 06/02/2023 2.8     Non-HDL 06/02/2023 107     LDL Calculated 06/02/2023 88     LDL/HDL 06/02/2023 1.4     VLDL 06/02/2023 19        Last Imaging:  X-Ray Shoulder 2 or More Views Left  Narrative: EXAMINATION:  XR SHOULDER COMPLETE 2 OR MORE VIEWS LEFT    CLINICAL HISTORY:  Pain in left shoulder    COMPARISON:  None available    TECHNIQUE:  XR SHOULDER 2 VIEWS LEFT    FINDINGS:  No evidence of fracture seen.  The alignment of the joints appears normal.  Mild acromioclavicular joint and mild glenohumeral joint degenerative change is present.  No soft tissue abnormality is seen.  Impression: Mild shoulder osteoarthrosis.    Electronically signed by: Parth Jarrett  Date:    06/13/2023  Time:    17:07  X-Ray Knee 1 or 2 View Left  Narrative: EXAMINATION:  XR KNEE 1 OR 2 VIEW LEFT    CLINICAL HISTORY:  Unilateral primary osteoarthritis, left knee    COMPARISON:  None available    TECHNIQUE:  XR KNEE 2 VIEW LEFT    FINDINGS:  No evidence of fracture seen.  The alignment of the joints appears normal.  Mild tricompartmental degenerative change is present.  No soft tissue abnormality is seen.  Impression: Mild knee osteoarthrosis.    Electronically signed by: Parth Jarrett  Date:    06/13/2023  Time:    17:06  X-Ray Chest PA And Lateral  Narrative: EXAMINATION:  XR CHEST PA AND LATERAL    CLINICAL HISTORY:  Heart failure, unspecified    COMPARISON:  22 December 2022    TECHNIQUE:  XR CHEST PA AND LATERAL    FINDINGS:  The heart and mediastinum are normal in size and configuration.    Pacemaker device is unchanged in position.    The pulmonary vascularity is normal in caliber.  No lung infiltrates,  effusions, pneumothorax or other abnormality is demonstrated.  Impression: No evidence of acute process or interval change.    Electronically signed by: Parth Jarrett  Date:    06/13/2023  Time:    17:06         **Labs and x-rays personally reviewed by me    ** reviewed      Objective:        Assessment:       1. Osteoarthritis of left knee, unspecified osteoarthritis type  X-Ray Knee 1 or 2 View Left    Ambulatory referral/consult to Orthopedics    Ambulatory referral/consult to Physical/Occupational Therapy      2. Left shoulder pain, unspecified chronicity  X-Ray Shoulder 2 or More Views Left      3. Congestive heart failure, unspecified HF chronicity, unspecified heart failure type  X-Ray Chest PA And Lateral    CT Chest High Resolution Without Contrast      4. Leg edema, left  US Lower Extremity Veins Left      5. Class 1 obesity with body mass index (BMI) of 31.0 to 31.9 in adult, unspecified obesity type, unspecified whether serious comorbidity present             Plan:         [unfilled]

## 2023-06-26 ENCOUNTER — OFFICE VISIT (OUTPATIENT)
Dept: ORTHOPEDICS | Facility: CLINIC | Age: 78
End: 2023-06-26
Payer: MEDICARE

## 2023-06-26 VITALS — BODY MASS INDEX: 31.36 KG/M2 | WEIGHT: 224 LBS | HEIGHT: 71 IN

## 2023-06-26 DIAGNOSIS — M17.12 OSTEOARTHRITIS OF LEFT KNEE, UNSPECIFIED OSTEOARTHRITIS TYPE: ICD-10-CM

## 2023-06-26 DIAGNOSIS — M71.22 BAKER'S CYST OF KNEE, LEFT: ICD-10-CM

## 2023-06-26 LAB — NONINV COLON CA DNA+OCC BLD SCRN STL QL: NEGATIVE

## 2023-06-26 PROCEDURE — 99213 OFFICE O/P EST LOW 20 MIN: CPT | Mod: PBBFAC | Performed by: ORTHOPAEDIC SURGERY

## 2023-06-26 PROCEDURE — 99203 OFFICE O/P NEW LOW 30 MIN: CPT | Mod: S$PBB,,, | Performed by: ORTHOPAEDIC SURGERY

## 2023-06-26 PROCEDURE — 99203 PR OFFICE/OUTPT VISIT, NEW, LEVL III, 30-44 MIN: ICD-10-PCS | Mod: S$PBB,,, | Performed by: ORTHOPAEDIC SURGERY

## 2023-06-26 NOTE — PROGRESS NOTES
Clinic Note        CC:   Chief Complaint   Patient presents with    Left Knee - Pain        Principal problem: No primary diagnosis found.     REASON FOR VISIT:       HISTORY:  78-year-old female complaining of left knee swelling.  She has an area over the posterior aspect of her knee the swelling she does not have a lot of pain associated with this.  She has no trauma to the knee.      PAST MEDICAL HISTORY:   Past Medical History:   Diagnosis Date    Asthma     Breast cancer     Cancer     MI, old           PAST SURGICAL HISTORY:   Past Surgical History:   Procedure Laterality Date    BREAST LUMPECTOMY Right     BREAST SURGERY      SINUS SURGERY            ALLERGIES: Review of patient's allergies indicates:  No Known Allergies     MEDICATIONS :    Current Outpatient Medications:     albuterol (PROVENTIL HFA) 90 mcg/actuation inhaler, Inhale 2 puffs into the lungs every 6 (six) hours as needed for Wheezing. Rescue, Disp: 6.7 g, Rfl: 3    azithromycin (Z-MORALES) 250 MG tablet, Take 2 tablets by mouth on day 1; Take 1 tablet by mouth on days 2-5, Disp: 6 tablet, Rfl: 1    benzonatate (TESSALON) 100 MG capsule, Take 1 capsule (100 mg total) by mouth 3 (three) times daily as needed for Cough., Disp: 30 capsule, Rfl: 2    benzonatate (TESSALON) 200 MG capsule, Take 1 capsule (200 mg total) by mouth 2 (two) times a day., Disp: 60 capsule, Rfl: 0    carvediloL (COREG) 25 MG tablet, Take 25 mg by mouth 2 (two) times daily with meals. , Disp: , Rfl:     ENTRESTO  mg per tablet, Take 1 tablet by mouth 2 (two) times daily. , Disp: , Rfl:     furosemide (LASIX) 80 MG tablet, Take 80 mg by mouth once daily. , Disp: , Rfl:     guaiFENesin (MUCINEX) 600 mg 12 hr tablet, Take 1 tablet (600 mg total) by mouth 2 (two) times daily as needed for Congestion. (Patient not taking: Reported on 6/2/2023), Disp: 20 tablet, Rfl: 1    spironolactone (ALDACTONE) 25 MG tablet, Take 50 mg by mouth once daily. , Disp: , Rfl:       SOCIAL HISTORY:   Social History     Socioeconomic History    Marital status: Single   Tobacco Use    Smoking status: Never     Passive exposure: Never    Smokeless tobacco: Never   Substance and Sexual Activity    Alcohol use: Never    Drug use: Never    Sexual activity: Not Currently          FAMILY HISTORY:   Family History   Problem Relation Age of Onset    Cancer Sister     Cancer Brother     Hypertension Brother           PHYSICAL EXAM:  In general, this is a well-developed, well-nourished female . The patient is alert, oriented and cooperative.      HEENT:  Normocephalic, atraumatic.  Extraocular movements are intact bilaterally.  The oropharynx is benign.       NECK:  Nontender with good range of motion.      LUNGS:  Clear to auscultation bilaterally.      HEART:  Demonstrates a regular rate and rhythm.  No murmurs appreciated.      ABDOMEN:  Soft, non-tender, non-distended.        EXTREMITIES:  Left lower extremity she moves her toes has sensation to touch has palpable pulses tender palpation minimally over the posterior medial aspect of the knee no instability the knee is noted on varus valgus stress full motion of the knee no crepitus      RADIOGRAPHIC FINDINGS:  X-rays show no fracture subluxation left knee there are mild degenerative changes      IMPRESSION: Osteoarthritis of left knee, unspecified osteoarthritis type  -     Ambulatory referral/consult to Orthopedics    Baker's cyst of knee, left         PLAN:  This time patient has a Baker's cyst is nontender she has some minimal symptoms over the mediolateral joint lines of the knees.  At this time we discussed treatment options.  She does not was out wish aspiration she is not having pain.  Let her weightbear as tolerates on the knee.  I will follow up if she has further problems.  There are no Patient Instructions on file for this visit.      Follow up if symptoms worsen or fail to improve.         Brayan Rios      (Subject to voice  recognition error, transcription service not allowed)

## 2023-07-11 ENCOUNTER — OFFICE VISIT (OUTPATIENT)
Dept: FAMILY MEDICINE | Facility: CLINIC | Age: 78
End: 2023-07-11
Payer: MEDICARE

## 2023-07-11 VITALS
SYSTOLIC BLOOD PRESSURE: 138 MMHG | HEIGHT: 71 IN | OXYGEN SATURATION: 97 % | TEMPERATURE: 98 F | RESPIRATION RATE: 18 BRPM | DIASTOLIC BLOOD PRESSURE: 80 MMHG | BODY MASS INDEX: 31.27 KG/M2 | HEART RATE: 81 BPM | WEIGHT: 223.38 LBS

## 2023-07-11 DIAGNOSIS — M19.90 OSTEOARTHRITIS, UNSPECIFIED OSTEOARTHRITIS TYPE, UNSPECIFIED SITE: Primary | ICD-10-CM

## 2023-07-11 DIAGNOSIS — E66.9 CLASS 1 OBESITY WITH BODY MASS INDEX (BMI) OF 31.0 TO 31.9 IN ADULT, UNSPECIFIED OBESITY TYPE, UNSPECIFIED WHETHER SERIOUS COMORBIDITY PRESENT: ICD-10-CM

## 2023-07-11 PROCEDURE — 99213 OFFICE O/P EST LOW 20 MIN: CPT | Mod: ,,, | Performed by: INTERNAL MEDICINE

## 2023-07-11 PROCEDURE — 99213 PR OFFICE/OUTPT VISIT, EST, LEVL III, 20-29 MIN: ICD-10-PCS | Mod: ,,, | Performed by: INTERNAL MEDICINE

## 2023-07-16 PROBLEM — M19.90 OSTEOARTHRITIS: Status: ACTIVE | Noted: 2023-07-16

## 2023-07-16 NOTE — PROGRESS NOTES
Subjective:       Patient ID: Tiana Medrano is a 78 y.o. female.    Chief Complaint: Osteoarthritis  Patient with pain in the left shoulder.  Patient does have evidence of degenerative joint disease of the left shoulder and also patient still complains of left knee discomfort patient has osteoarthritis of the left knee.  Continue to recommend over-the-counter Tylenol.  Follow up with Orthopedics as needed.  Osteoarthritis  Associated symptoms include arthralgias. Pertinent negatives include no abdominal pain, chest pain, fatigue or fever.   .    Current Medications:    Current Outpatient Medications:     albuterol (PROVENTIL HFA) 90 mcg/actuation inhaler, Inhale 2 puffs into the lungs every 6 (six) hours as needed for Wheezing. Rescue, Disp: 6.7 g, Rfl: 3    azithromycin (Z-MORALES) 250 MG tablet, Take 2 tablets by mouth on day 1; Take 1 tablet by mouth on days 2-5, Disp: 6 tablet, Rfl: 1    benzonatate (TESSALON) 100 MG capsule, Take 1 capsule (100 mg total) by mouth 3 (three) times daily as needed for Cough., Disp: 30 capsule, Rfl: 2    benzonatate (TESSALON) 200 MG capsule, Take 1 capsule (200 mg total) by mouth 2 (two) times a day., Disp: 60 capsule, Rfl: 0    carvediloL (COREG) 25 MG tablet, Take 25 mg by mouth 2 (two) times daily with meals. , Disp: , Rfl:     ENTRESTO  mg per tablet, Take 1 tablet by mouth 2 (two) times daily. , Disp: , Rfl:     furosemide (LASIX) 80 MG tablet, Take 80 mg by mouth once daily. , Disp: , Rfl:     spironolactone (ALDACTONE) 25 MG tablet, Take 50 mg by mouth once daily. , Disp: , Rfl:     guaiFENesin (MUCINEX) 600 mg 12 hr tablet, Take 1 tablet (600 mg total) by mouth 2 (two) times daily as needed for Congestion. (Patient not taking: Reported on 6/2/2023), Disp: 20 tablet, Rfl: 1           Review of Systems   Constitutional:  Negative for appetite change, fatigue and fever.   Respiratory:  Negative for shortness of breath.    Cardiovascular:  Negative for chest pain.  "  Gastrointestinal:  Negative for abdominal pain and constipation.   Endocrine: Negative for polydipsia, polyphagia and polyuria.   Genitourinary:  Negative for difficulty urinating, frequency and hot flashes.   Musculoskeletal:  Positive for arthralgias.   Allergic/Immunologic: Negative for environmental allergies.   Neurological:  Negative for dizziness and light-headedness.   Psychiatric/Behavioral:  Negative for agitation.               Vitals:    07/11/23 1456   BP: 138/80   BP Location: Right arm   Patient Position: Sitting   BP Method: Medium (Manual)   Pulse: 81   Resp: 18   Temp: 97.5 °F (36.4 °C)   TempSrc: Temporal   SpO2: 97%   Weight: 101.3 kg (223 lb 6.4 oz)   Height: 5' 11" (1.803 m)        Physical Exam  Vitals and nursing note reviewed.   Constitutional:       Appearance: Normal appearance.   Cardiovascular:      Rate and Rhythm: Normal rate and regular rhythm.      Pulses: Normal pulses.      Heart sounds: Normal heart sounds.   Pulmonary:      Effort: Pulmonary effort is normal.      Breath sounds: Normal breath sounds.   Abdominal:      General: Abdomen is flat. Bowel sounds are normal.      Palpations: Abdomen is soft.   Musculoskeletal:         General: Normal range of motion.   Skin:     General: Skin is warm and dry.   Neurological:      General: No focal deficit present.      Mental Status: She is alert and oriented to person, place, and time. Mental status is at baseline.         Last Labs:     No visits with results within 1 Month(s) from this visit.   Latest known visit with results is:   Office Visit on 06/02/2023   Component Date Value    Cologuard Result 06/20/2023 Negative     Triglycerides 06/02/2023 96     Cholesterol 06/02/2023 168     HDL Cholesterol 06/02/2023 61 (H)     Cholesterol/HDL Ratio (R* 06/02/2023 2.8     Non-HDL 06/02/2023 107     LDL Calculated 06/02/2023 88     LDL/HDL 06/02/2023 1.4     VLDL 06/02/2023 19        Last Imaging:  X-Ray Shoulder 2 or More Views " Left  Narrative: EXAMINATION:  XR SHOULDER COMPLETE 2 OR MORE VIEWS LEFT    CLINICAL HISTORY:  Pain in left shoulder    COMPARISON:  None available    TECHNIQUE:  XR SHOULDER 2 VIEWS LEFT    FINDINGS:  No evidence of fracture seen.  The alignment of the joints appears normal.  Mild acromioclavicular joint and mild glenohumeral joint degenerative change is present.  No soft tissue abnormality is seen.  Impression: Mild shoulder osteoarthrosis.    Electronically signed by: Parht Jarrett  Date:    06/13/2023  Time:    17:07  X-Ray Knee 1 or 2 View Left  Narrative: EXAMINATION:  XR KNEE 1 OR 2 VIEW LEFT    CLINICAL HISTORY:  Unilateral primary osteoarthritis, left knee    COMPARISON:  None available    TECHNIQUE:  XR KNEE 2 VIEW LEFT    FINDINGS:  No evidence of fracture seen.  The alignment of the joints appears normal.  Mild tricompartmental degenerative change is present.  No soft tissue abnormality is seen.  Impression: Mild knee osteoarthrosis.    Electronically signed by: Parth Jarrett  Date:    06/13/2023  Time:    17:06  X-Ray Chest PA And Lateral  Narrative: EXAMINATION:  XR CHEST PA AND LATERAL    CLINICAL HISTORY:  Heart failure, unspecified    COMPARISON:  22 December 2022    TECHNIQUE:  XR CHEST PA AND LATERAL    FINDINGS:  The heart and mediastinum are normal in size and configuration.    Pacemaker device is unchanged in position.    The pulmonary vascularity is normal in caliber.  No lung infiltrates, effusions, pneumothorax or other abnormality is demonstrated.  Impression: No evidence of acute process or interval change.    Electronically signed by: Parth Jarrett  Date:    06/13/2023  Time:    17:06         **Labs and x-rays personally reviewed by me    ** reviewed      Objective:        Assessment:       1. Osteoarthritis, unspecified osteoarthritis type, unspecified site        2. Class 1 obesity with body mass index (BMI) of 31.0 to 31.9 in adult, unspecified obesity type, unspecified  whether serious comorbidity present             Plan:         [email or web address]@

## 2023-07-21 ENCOUNTER — HOSPITAL ENCOUNTER (OUTPATIENT)
Dept: RADIOLOGY | Facility: HOSPITAL | Age: 78
Discharge: HOME | End: 2023-07-21
Attending: INTERNAL MEDICINE
Payer: MEDICARE

## 2023-07-21 DIAGNOSIS — I50.9 CONGESTIVE HEART FAILURE, UNSPECIFIED HF CHRONICITY, UNSPECIFIED HEART FAILURE TYPE: ICD-10-CM

## 2023-07-21 DIAGNOSIS — R60.0 LEG EDEMA, LEFT: ICD-10-CM

## 2023-07-21 PROCEDURE — 71250 CT THORAX DX C-: CPT | Mod: 26,,, | Performed by: STUDENT IN AN ORGANIZED HEALTH CARE EDUCATION/TRAINING PROGRAM

## 2023-07-21 PROCEDURE — 93971 EXTREMITY STUDY: CPT | Mod: 26,LT,, | Performed by: RADIOLOGY

## 2023-07-21 PROCEDURE — 93971 US LOWER EXTREMITY VEINS LEFT: ICD-10-PCS | Mod: 26,LT,, | Performed by: RADIOLOGY

## 2023-07-21 PROCEDURE — 71250 CT THORAX DX C-: CPT | Mod: TC

## 2023-07-21 PROCEDURE — 71250 CT CHEST HIGH RESOLUTION WITHOUT CONTRAST: ICD-10-PCS | Mod: 26,,, | Performed by: STUDENT IN AN ORGANIZED HEALTH CARE EDUCATION/TRAINING PROGRAM

## 2023-07-21 PROCEDURE — 93971 EXTREMITY STUDY: CPT | Mod: TC,LT

## 2023-07-25 ENCOUNTER — TELEPHONE (OUTPATIENT)
Dept: FAMILY MEDICINE | Facility: CLINIC | Age: 78
End: 2023-07-25
Payer: MEDICARE

## 2023-07-25 NOTE — TELEPHONE ENCOUNTER
----- Message from Cory Monk MD sent at 7/24/2023 10:50 AM CDT -----  Need to see in  1 week please  abnl results     [id number] Call made to pt regarding above message; pt scheduled to come in on 8/3/23; pt has no other questions at this time

## 2023-08-01 ENCOUNTER — OFFICE VISIT (OUTPATIENT)
Dept: OBSTETRICS AND GYNECOLOGY | Facility: CLINIC | Age: 78
End: 2023-08-01
Payer: MEDICARE

## 2023-08-01 VITALS
HEIGHT: 71 IN | HEART RATE: 72 BPM | SYSTOLIC BLOOD PRESSURE: 109 MMHG | WEIGHT: 221.38 LBS | DIASTOLIC BLOOD PRESSURE: 65 MMHG | BODY MASS INDEX: 30.99 KG/M2

## 2023-08-01 DIAGNOSIS — Z12.4 SCREENING FOR CERVICAL CANCER: ICD-10-CM

## 2023-08-01 DIAGNOSIS — Z12.4 CERVICAL CANCER SCREENING: Primary | ICD-10-CM

## 2023-08-01 DIAGNOSIS — Z01.419 ROUTINE GYNECOLOGICAL EXAMINATION: ICD-10-CM

## 2023-08-01 PROCEDURE — 87624 HPV HI-RISK TYP POOLED RSLT: CPT | Mod: ,,, | Performed by: CLINICAL MEDICAL LABORATORY

## 2023-08-01 PROCEDURE — G0101 PR CA SCREEN;PELVIC/BREAST EXAM: ICD-10-PCS | Mod: GZ,,, | Performed by: OBSTETRICS & GYNECOLOGY

## 2023-08-01 PROCEDURE — G0101 CA SCREEN;PELVIC/BREAST EXAM: HCPCS | Mod: GZ,,, | Performed by: OBSTETRICS & GYNECOLOGY

## 2023-08-01 PROCEDURE — 87624 HUMAN PAPILLOMAVIRUS (HPV): ICD-10-PCS | Mod: ,,, | Performed by: CLINICAL MEDICAL LABORATORY

## 2023-08-01 PROCEDURE — G0123 SCREEN CERV/VAG THIN LAYER: HCPCS | Mod: TC,GCY | Performed by: OBSTETRICS & GYNECOLOGY

## 2023-08-01 NOTE — PROGRESS NOTES
"CC: Well woman exam    Taina Medrano is a 78 y.o. female  presents for well woman exam.    LMP: No LMP recorded. Patient is postmenopausal..    Last mammogram: 22  Last Colonoscopy: unknown    Denies any issues, problems, or complaints.     Past Medical History:   Diagnosis Date    Asthma     Breast cancer     Cancer     MI, old      Past Surgical History:   Procedure Laterality Date    BREAST LUMPECTOMY Right     BREAST SURGERY      SINUS SURGERY       Social History     Socioeconomic History    Marital status: Single   Tobacco Use    Smoking status: Never     Passive exposure: Never    Smokeless tobacco: Never   Substance and Sexual Activity    Alcohol use: Never    Drug use: Never    Sexual activity: Not Currently     Family History   Problem Relation Age of Onset    Cancer Sister     Cancer Brother     Hypertension Brother      OB History          2    Para   2    Term   2            AB        Living             SAB        IAB        Ectopic        Multiple        Live Births                     /65   Pulse 72   Ht 5' 11" (1.803 m)   Wt 100.4 kg (221 lb 6.4 oz)   BMI 30.88 kg/m²       ROS:  GENERAL: Denies weight gain or weight loss. Feeling well overall.   SKIN: Denies rash or lesions.   HEAD: Denies head injury or headache.   NODES: Denies enlarged lymph nodes.   CHEST: Denies chest pain or shortness of breath.   CARDIOVASCULAR: Denies palpitations or left sided chest pain.   ABDOMEN: No abdominal pain, constipation, diarrhea, nausea, vomiting or rectal bleeding.   URINARY: No frequency, dysuria, hematuria, or burning on urination.  REPRODUCTIVE: See HPI.   BREASTS: The patient performs breast self-examination and denies pain, lumps, or nipple discharge.   HEMATOLOGIC: No easy bruisability or excessive bleeding.   MUSCULOSKELETAL: Denies joint pain or swelling.   NEUROLOGIC: Denies syncope or weakness.   PSYCHIATRIC: Denies depression, anxiety or mood swings.    PHYSICAL " EXAM:  APPEARANCE: Well nourished, well developed, in no acute distress.  AFFECT: WNL, alert and oriented x 3  SKIN: No acne or hirsutism  NECK: Neck symmetric without masses or thyromegaly  NODES: No inguinal, cervical, axillary, or femoral lymph node enlargement  CHEST: Good respiratory effect  ABDOMEN: Soft.  No tenderness or masses.  No hepatosplenomegaly.  No hernias.  BREASTS: Symmetrical, no skin changes or visible lesions.  No palpable masses, nipple discharge bilaterally.  PELVIC: Normal external genitalia without lesions.  Normal hair distribution.  Adequate perineal body, normal urethral meatus.  Vagina moist and well rugated without lesions or discharge.  Cervix pink, without lesions, discharge or tenderness.  No significant cystocele or rectocele.  Bimanual exam shows uterus to be normal size, regular, mobile and nontender.  Adnexa without masses or tenderness.    EXTREMITIES: No edema.    Cervical cancer screening  -     ThinPrep Pap Test; Future; Expected date: 08/01/2023    Routine gynecological examination  -     ThinPrep Pap Test; Future; Expected date: 08/01/2023    Screening for cervical cancer  -     Ambulatory referral/consult to Gynecology            Patient was counseled today on A.C.S. Pap guidelines and recommendations for yearly pelvic exams, mammograms and monthly self breast exams; to see her PCP for other health maintenance.   Exercise regimen encouraged  Healthy food choices encouraged  Questions answered to desired level of satisfaction  Verbalized understanding to all information and instructions    Follow up in about 1 year (around 8/1/2024) for annual.      Zack Estrada M.D., FCOG    OB/GYN

## 2023-08-03 ENCOUNTER — OFFICE VISIT (OUTPATIENT)
Dept: FAMILY MEDICINE | Facility: CLINIC | Age: 78
End: 2023-08-03
Payer: MEDICARE

## 2023-08-03 VITALS
HEIGHT: 71 IN | DIASTOLIC BLOOD PRESSURE: 67 MMHG | RESPIRATION RATE: 18 BRPM | TEMPERATURE: 97 F | HEART RATE: 76 BPM | OXYGEN SATURATION: 96 % | WEIGHT: 224 LBS | SYSTOLIC BLOOD PRESSURE: 102 MMHG | BODY MASS INDEX: 31.36 KG/M2

## 2023-08-03 DIAGNOSIS — J90 PLEURAL EFFUSION: ICD-10-CM

## 2023-08-03 DIAGNOSIS — I50.9 CONGESTIVE HEART FAILURE, UNSPECIFIED HF CHRONICITY, UNSPECIFIED HEART FAILURE TYPE: ICD-10-CM

## 2023-08-03 LAB — NT-PROBNP SERPL-MCNC: 120 PG/ML (ref 1–450)

## 2023-08-03 PROCEDURE — 83880 ASSAY OF NATRIURETIC PEPTIDE: CPT | Mod: ,,, | Performed by: CLINICAL MEDICAL LABORATORY

## 2023-08-03 PROCEDURE — 83880 NT-PRO NATRIURETIC PEPTIDE: ICD-10-PCS | Mod: ,,, | Performed by: CLINICAL MEDICAL LABORATORY

## 2023-08-03 PROCEDURE — 99214 PR OFFICE/OUTPT VISIT, EST, LEVL IV, 30-39 MIN: ICD-10-PCS | Mod: ,,, | Performed by: INTERNAL MEDICINE

## 2023-08-03 PROCEDURE — 99214 OFFICE O/P EST MOD 30 MIN: CPT | Mod: ,,, | Performed by: INTERNAL MEDICINE

## 2023-08-03 RX ORDER — FUROSEMIDE 80 MG/1
TABLET ORAL
Qty: 30 TABLET | Refills: 4 | Status: SHIPPED | OUTPATIENT
Start: 2023-08-03 | End: 2024-01-04

## 2023-08-03 RX ORDER — POTASSIUM CHLORIDE 750 MG/1
10 TABLET, EXTENDED RELEASE ORAL DAILY
Qty: 30 TABLET | Refills: 2 | Status: SHIPPED | OUTPATIENT
Start: 2023-08-03

## 2023-08-03 RX ORDER — BENZONATATE 200 MG/1
200 CAPSULE ORAL 2 TIMES DAILY
Qty: 60 CAPSULE | Refills: 0 | Status: SHIPPED | OUTPATIENT
Start: 2023-08-03 | End: 2023-10-24 | Stop reason: ALTCHOICE

## 2023-08-05 LAB
GH SERPL-MCNC: NORMAL NG/ML
INSULIN SERPL-ACNC: NORMAL U[IU]/ML
LAB AP CLINICAL INFORMATION: NORMAL
LAB AP GYN INTERPRETATION: NEGATIVE
LAB AP PAP DISCLAIMER COMMENTS: NORMAL
RENIN PLAS-CCNC: NORMAL NG/ML/H

## 2023-08-09 PROBLEM — J90 PLEURAL EFFUSION: Status: ACTIVE | Noted: 2023-08-09

## 2023-08-09 LAB
HPV 16: NEGATIVE
HPV 18: NEGATIVE
HPV OTHER: NEGATIVE

## 2023-08-09 NOTE — PROGRESS NOTES
Subjective:       Patient ID: Taina Medrano is a 78 y.o. female.    Chief Complaint: Results (Abnl result)    HPI  .  Patient presents with chronic pleural effusion also patient does have evidence of a Baker cyst patient also complains of shortness a breath at rest.  Ortho is just going to follow the Baker's cyst.    Because of the pleural effusion will refer to pulmonary.  In regards to the CHF will check a BNP.  Going to change Lasix to 80 mg in the morning and 40 mg p.m..  Supplement this with KCl 10 mEq 1 p.o. q.day.    Health maintenance tetanus shot today.  Current Medications:    Current Outpatient Medications:     albuterol (PROVENTIL HFA) 90 mcg/actuation inhaler, Inhale 2 puffs into the lungs every 6 (six) hours as needed for Wheezing. Rescue, Disp: 6.7 g, Rfl: 3    carvediloL (COREG) 25 MG tablet, Take 25 mg by mouth 2 (two) times daily with meals. , Disp: , Rfl:     ENTRESTO  mg per tablet, Take 1 tablet by mouth 2 (two) times daily. , Disp: , Rfl:     spironolactone (ALDACTONE) 25 MG tablet, Take 50 mg by mouth once daily. , Disp: , Rfl:     azithromycin (Z-MORALES) 250 MG tablet, Take 2 tablets by mouth on day 1; Take 1 tablet by mouth on days 2-5 (Patient not taking: Reported on 8/1/2023), Disp: 6 tablet, Rfl: 1    benzonatate (TESSALON) 100 MG capsule, Take 1 capsule (100 mg total) by mouth 3 (three) times daily as needed for Cough. (Patient not taking: Reported on 8/1/2023), Disp: 30 capsule, Rfl: 2    benzonatate (TESSALON) 200 MG capsule, Take 1 capsule (200 mg total) by mouth 2 (two) times a day., Disp: 60 capsule, Rfl: 0    furosemide (LASIX) 80 MG tablet, Take 1 tablet (80 mg total) by mouth every morning AND 0.5 tablets (40 mg total) every evening., Disp: 30 tablet, Rfl: 4    guaiFENesin (MUCINEX) 600 mg 12 hr tablet, Take 1 tablet (600 mg total) by mouth 2 (two) times daily as needed for Congestion. (Patient not taking: Reported on 6/2/2023), Disp: 20 tablet, Rfl: 1    potassium chloride SA  "(SHELIA,KLOR-CON M) 10 MEQ tablet, Take 1 tablet (10 mEq total) by mouth once daily., Disp: 30 tablet, Rfl: 2           Review of Systems   Constitutional:  Negative for appetite change, fatigue and fever.   Respiratory:  Negative for shortness of breath.    Cardiovascular:  Negative for chest pain.   Gastrointestinal:  Negative for abdominal pain and constipation.   Endocrine: Negative for polydipsia, polyphagia and polyuria.   Genitourinary:  Negative for difficulty urinating, frequency and hot flashes.   Allergic/Immunologic: Negative for environmental allergies.   Neurological:  Negative for dizziness and light-headedness.   Psychiatric/Behavioral:  Negative for agitation.                 Vitals:    08/03/23 1023   BP: 102/67   BP Location: Right arm   Patient Position: Sitting   BP Method: Large (Manual)   Pulse: 76   Resp: 18   Temp: 97.1 °F (36.2 °C)   TempSrc: Temporal   SpO2: 96%   Weight: 101.6 kg (224 lb)   Height: 5' 11" (1.803 m)        Physical Exam  Vitals and nursing note reviewed.   Constitutional:       Appearance: Normal appearance.   Cardiovascular:      Rate and Rhythm: Normal rate and regular rhythm.      Pulses: Normal pulses.      Heart sounds: Normal heart sounds.   Pulmonary:      Effort: Pulmonary effort is normal.      Breath sounds: Normal breath sounds.   Abdominal:      General: Abdomen is flat. Bowel sounds are normal.      Palpations: Abdomen is soft.   Musculoskeletal:         General: Normal range of motion.   Skin:     General: Skin is warm and dry.   Neurological:      General: No focal deficit present.      Mental Status: She is alert and oriented to person, place, and time. Mental status is at baseline.           Last Labs:     Office Visit on 08/03/2023   Component Date Value    ProBNP 08/03/2023 120    Office Visit on 08/01/2023   Component Date Value    Case Report 08/01/2023                      Value:Pap Cytology                                      Case: [id number]          "                          Authorizing Provider:  Zack Estrada MD   Collected:           08/01/2023 12:59 PM          Ordering Location[facility]     [facility]   Received:            08/02/2023 08:20 AM                                 Clinic - OB/GYN                                                              First Screen:          Maura Fernandez CT(ASCP)                                                   Specimen:    Liquid-Based Pap Test, Screening, Cervix                                                   Interpretation 08/01/2023 Negative     General Categorization 08/01/2023 Negative for intraepithelial lesion or malignancy     Specimen Adequacy 08/01/2023 Satisfactory for evaluation     Clinical Information 08/01/2023                      Value:This result contains rich text formatting which cannot be displayed here.    Disclaimer 08/01/2023                      Value:This result contains rich text formatting which cannot be displayed here.    HPV 16 08/01/2023 Negative     HPV 18 08/01/2023 Negative     HPV Other 08/01/2023 Negative        Last Imaging:  US Lower Extremity Veins Left  Narrative: EXAMINATION:  US LOWER EXTREMITY VEINS LEFT    CLINICAL HISTORY:  Localized edema    TECHNIQUE:  Duplex and color flow Doppler evaluation and graded compression of the left lower extremity veins was performed.  Ultrasound images captured and stored.    COMPARISON:  None    FINDINGS:  Left thigh veins: The common femoral, femoral, popliteal, upper greater saphenous, and deep femoral veins are patent and free of thrombus. The veins are normally compressible and have normal phasic flow and augmentation response.    Left calf veins: The visualized calf veins are patent.    A large Baker's cyst is seen.  Impression: No evidence of deep venous thrombosis in the left lower extremity.    Electronically signed by: Lukasz Izquierdo  Date:    07/21/2023  Time:    13:38  CT Chest High Resolution Without  Contrast  Narrative: EXAMINATION:  CT CHEST HIGH RESOLUTION WITHOUT CONTRAST    CLINICAL HISTORY:  Heart failure, unspecified    TECHNIQUE:  CT CHEST HIGH RESOLUTION WITHOUT CONTRAST    COMPARISON:  6/13/23    FINDINGS:  The visualized portions of the lungs are clear.  No pulmonary edema.  The pleura is normal.    The heart is enlarged.  There is a small to moderately sized pericardial effusion.  The upper abdomen is normal.  Multilevel degenerative change.  Impression: No evidence of pulmonary edema.    Heart is enlarged.  Small to moderately sized pericardial effusion.    Electronically signed by: Sanchez King  Date:    07/21/2023  Time:    13:13         **Labs and x-rays personally reviewed by me    ** reviewed      Objective:        Assessment:       1. Congestive heart failure, unspecified HF chronicity, unspecified heart failure type  NT-Pro Natriuretic Peptide    NT-Pro Natriuretic Peptide      2. Pleural effusion  Ambulatory referral/consult to Pulmonology           Plan:         [unfilled]

## 2023-08-17 ENCOUNTER — OFFICE VISIT (OUTPATIENT)
Dept: FAMILY MEDICINE | Facility: CLINIC | Age: 78
End: 2023-08-17
Payer: MEDICARE

## 2023-08-17 ENCOUNTER — APPOINTMENT (OUTPATIENT)
Dept: RADIOLOGY | Facility: CLINIC | Age: 78
End: 2023-08-17
Attending: INTERNAL MEDICINE
Payer: MEDICARE

## 2023-08-17 VITALS
BODY MASS INDEX: 31.03 KG/M2 | HEIGHT: 71 IN | WEIGHT: 221.63 LBS | TEMPERATURE: 97 F | SYSTOLIC BLOOD PRESSURE: 112 MMHG | HEART RATE: 89 BPM | OXYGEN SATURATION: 97 % | DIASTOLIC BLOOD PRESSURE: 65 MMHG | RESPIRATION RATE: 18 BRPM

## 2023-08-17 DIAGNOSIS — M17.11 OSTEOARTHRITIS OF RIGHT KNEE, UNSPECIFIED OSTEOARTHRITIS TYPE: Primary | ICD-10-CM

## 2023-08-17 DIAGNOSIS — R60.0 LEG EDEMA, RIGHT: ICD-10-CM

## 2023-08-17 DIAGNOSIS — M17.11 OSTEOARTHRITIS OF RIGHT KNEE, UNSPECIFIED OSTEOARTHRITIS TYPE: ICD-10-CM

## 2023-08-17 PROCEDURE — 99213 OFFICE O/P EST LOW 20 MIN: CPT | Mod: ,,, | Performed by: INTERNAL MEDICINE

## 2023-08-17 PROCEDURE — 73560 X-RAY EXAM OF KNEE 1 OR 2: CPT | Mod: TC,RHCUB,RT | Performed by: INTERNAL MEDICINE

## 2023-08-17 PROCEDURE — 99213 PR OFFICE/OUTPT VISIT, EST, LEVL III, 20-29 MIN: ICD-10-PCS | Mod: ,,, | Performed by: INTERNAL MEDICINE

## 2023-08-22 ENCOUNTER — OFFICE VISIT (OUTPATIENT)
Dept: ORTHOPEDICS | Facility: CLINIC | Age: 78
End: 2023-08-22
Payer: MEDICARE

## 2023-08-22 DIAGNOSIS — M17.11 OSTEOARTHRITIS OF RIGHT KNEE, UNSPECIFIED OSTEOARTHRITIS TYPE: ICD-10-CM

## 2023-08-22 PROCEDURE — 99213 OFFICE O/P EST LOW 20 MIN: CPT | Mod: PBBFAC,25 | Performed by: NURSE PRACTITIONER

## 2023-08-22 PROCEDURE — 99999PBSHW PR PBB SHADOW TECHNICAL ONLY FILED TO HB: ICD-10-PCS | Mod: PBBFAC,,,

## 2023-08-22 PROCEDURE — 99203 PR OFFICE/OUTPT VISIT, NEW, LEVL III, 30-44 MIN: ICD-10-PCS | Mod: S$PBB,25,, | Performed by: NURSE PRACTITIONER

## 2023-08-22 PROCEDURE — 99999PBSHW PR PBB SHADOW TECHNICAL ONLY FILED TO HB: Mod: PBBFAC,,,

## 2023-08-22 PROCEDURE — 20610 LARGE JOINT ASPIRATION/INJECTION: R SUPRA PATELLAR BURSA: ICD-10-PCS | Mod: S$PBB,RT,, | Performed by: NURSE PRACTITIONER

## 2023-08-22 PROCEDURE — 20610 DRAIN/INJ JOINT/BURSA W/O US: CPT | Mod: PBBFAC | Performed by: NURSE PRACTITIONER

## 2023-08-22 PROCEDURE — 99203 OFFICE O/P NEW LOW 30 MIN: CPT | Mod: S$PBB,25,, | Performed by: NURSE PRACTITIONER

## 2023-08-22 RX ORDER — TRIAMCINOLONE ACETONIDE 40 MG/ML
40 INJECTION, SUSPENSION INTRA-ARTICULAR; INTRAMUSCULAR
Status: DISCONTINUED | OUTPATIENT
Start: 2023-08-22 | End: 2023-08-22 | Stop reason: HOSPADM

## 2023-08-22 RX ADMIN — TRIAMCINOLONE ACETONIDE 40 MG: 40 INJECTION, SUSPENSION INTRA-ARTICULAR; INTRAMUSCULAR at 02:08

## 2023-08-22 NOTE — PROGRESS NOTES
HPI:   Taina Medrano is a pleasant 78 y.o. patient who reports to clinic for evaluation of left knee pain. Pain started 3 weeks ago, no injuries. Reports her knee started hurting and swelling. She has tried voltaren gel which helps some. Also taking tylenol. Her cardiologist told her not to take anti inflammatories. .     Injury onset and description:   Patient's occupation:   This is not a work related injury.   This injury has been non-responsive to conservative care. The pain is worse with repetitive use, and strenuous activity is very difficult.  her pain improves with rest.   PAST MEDICAL HISTORY:   Past Medical History:   Diagnosis Date    Asthma     Breast cancer     Cancer     MI, old      PAST SURGICAL HISTORY:   Past Surgical History:   Procedure Laterality Date    BREAST LUMPECTOMY Right     BREAST SURGERY      SINUS SURGERY       MEDICATIONS:    Current Outpatient Medications:     albuterol (PROVENTIL HFA) 90 mcg/actuation inhaler, Inhale 2 puffs into the lungs every 6 (six) hours as needed for Wheezing. Rescue, Disp: 6.7 g, Rfl: 3    azithromycin (Z-MORALES) 250 MG tablet, Take 2 tablets by mouth on day 1; Take 1 tablet by mouth on days 2-5 (Patient not taking: Reported on 8/1/2023), Disp: 6 tablet, Rfl: 1    benzonatate (TESSALON) 100 MG capsule, Take 1 capsule (100 mg total) by mouth 3 (three) times daily as needed for Cough. (Patient not taking: Reported on 8/1/2023), Disp: 30 capsule, Rfl: 2    benzonatate (TESSALON) 200 MG capsule, Take 1 capsule (200 mg total) by mouth 2 (two) times a day., Disp: 60 capsule, Rfl: 0    carvediloL (COREG) 25 MG tablet, Take 25 mg by mouth 2 (two) times daily with meals. , Disp: , Rfl:     ENTRESTO  mg per tablet, Take 1 tablet by mouth 2 (two) times daily. , Disp: , Rfl:     furosemide (LASIX) 80 MG tablet, Take 1 tablet (80 mg total) by mouth every morning AND 0.5 tablets (40 mg total) every evening., Disp: 30 tablet, Rfl: 4    guaiFENesin (MUCINEX) 600 mg 12  hr tablet, Take 1 tablet (600 mg total) by mouth 2 (two) times daily as needed for Congestion. (Patient not taking: Reported on 6/2/2023), Disp: 20 tablet, Rfl: 1    potassium chloride SA (K-DUR,KLOR-CON M) 10 MEQ tablet, Take 1 tablet (10 mEq total) by mouth once daily., Disp: 30 tablet, Rfl: 2    spironolactone (ALDACTONE) 25 MG tablet, Take 50 mg by mouth once daily. , Disp: , Rfl:   ALLERGIES:   Review of patient's allergies indicates:  No Known Allergies      PHYSICAL EXAM:  VITAL SIGNS: There were no vitals taken for this visit.  General: Well-developed well-nourished 78 y.o. femalein no acute distress;Cardiovascular: Regular rhythm by palpation of distal pulse, normal color and temperature, no concerning varicosities on symptomatic side Lungs: No labored breathing or wheezing appreciated Neuro: Alert and oriented ×3 Psychiatric: well oriented to person, place and time, demonstrates normal mood and affect Skin: No rashes, lesions or ulcers, normal temperature, turgor, and texture on uninvolved extremity    General    Constitutional: She is oriented to person, place, and time. She appears well-nourished.   HENT:   Head: Normocephalic and atraumatic.   Eyes: Pupils are equal, round, and reactive to light.   Neck: Neck supple.   Cardiovascular:  Normal rate and regular rhythm.            Pulmonary/Chest: Effort normal. No respiratory distress.   Abdominal: There is no abdominal tenderness. There is no guarding.   Neurological: She is alert and oriented to person, place, and time. She has normal reflexes.   Psychiatric: She has a normal mood and affect. Her behavior is normal. Judgment and thought content normal.           Right Knee Exam     Inspection   Swelling: present  Effusion: present    Tenderness   The patient is tender to palpation of the medial joint line and lateral joint line.    Crepitus   The patient has crepitus of the patella.    Range of Motion   Extension:  normal   Flexion:  abnormal     Tests    Meniscus   Ervin:  Medial - positive   Ligament Examination   Lachman: normal (-1 to 2mm)   PCL-Posterior Drawer: normal (0 to 2mm)     Patella   Patellar Tracking: normal  Patellar Grind: positive    Other   Sensation: normal    Muscle Strength   Right Lower Extremity   Quadriceps:  5/5   Hamstrin/5     Reflexes     Right Side   Achilles:  2+  Quadriceps:  2+    Vascular Exam     Right Pulses  Dorsalis Pedis:      2+  Posterior Tibial:      2+          IMAGING:  X-Ray Knee 1 or 2 View Right    Result Date: 2023  EXAMINATION: XR KNEE 1 OR 2 VIEW RIGHT CLINICAL HISTORY: Unilateral primary osteoarthritis, right knee COMPARISON: None TECHNIQUE: Frontal and lateral views of the right knee. FINDINGS: Mild tricompartmental degenerative change of the knee.  No convincing acute fracture or dislocation demonstrated. No concerning radiopaque foreign body visualized.     As above. Point of Service: [place] [facility] Electronically signed by: Santy Tena Date:    2023 Time:    11:25        ASSESSMENT:      ICD-10-CM ICD-9-CM   1. Osteoarthritis of right knee, unspecified osteoarthritis type  M17.11 715.96       PLAN:     -Findings and treatment options were discussed with the patient  -All questions answered  Right knee injection today  RTC in 6 months for recheck     There are no Patient Instructions on file for this visit.  Orders Placed This Encounter   Procedures    Large Joint Aspiration/Injection: R supra patellar bursa     This order was created via procedure documentation     Large Joint Aspiration/Injection: R supra patellar bursa    Date/Time: 2023 2:00 PM    Performed by: Kisha Rodriguez FNP  Authorized by: Kisha Rodriguez FNP    Consent Done?:  Yes (Verbal)  Indications:  Pain  Site marked: the procedure site was marked    Local anesthetic:  Bupivacaine 0.25% without epinephrine    Details:  Needle Size:  22 G  Location:  Knee  Site:  R supra patellar bursa  Medications:  40 mg  triamcinolone acetonide 40 mg/mL  Patient tolerance:  Patient tolerated the procedure well with no immediate complications

## 2023-08-25 NOTE — PROGRESS NOTES
Subjective:       Patient ID: Taina Medrano is a 78 y.o. female.    Chief Complaint: Congestive Heart Failure (2 weeks ) and Knee Pain (Right )  Patient complains of moderate pain in the left knee.  Patient has chronic osteoarthritis left knee.  Also patient complains of swelling in the right leg.    The plan x-ray the right knee.  Patient also complains of pain in the right knee.    To recap  Patient does have pain in both knees.  Pain is getting worse in the right knee therefore x-ray of the right knee.  Patient has known osteoarthritis of the left knee.    Right leg patient does have calf tenderness and swelling.  Will order a venous Doppler of the right lower extremity.    For the history of osteoarthritis will refer to orthopedics.  Congestive Heart Failure  Pertinent negatives include no abdominal pain, chest pain, fatigue or shortness of breath.   Knee Pain       .    Current Medications:    Current Outpatient Medications:     albuterol (PROVENTIL HFA) 90 mcg/actuation inhaler, Inhale 2 puffs into the lungs every 6 (six) hours as needed for Wheezing. Rescue, Disp: 6.7 g, Rfl: 3    benzonatate (TESSALON) 200 MG capsule, Take 1 capsule (200 mg total) by mouth 2 (two) times a day., Disp: 60 capsule, Rfl: 0    carvediloL (COREG) 25 MG tablet, Take 25 mg by mouth 2 (two) times daily with meals. , Disp: , Rfl:     ENTRESTO  mg per tablet, Take 1 tablet by mouth 2 (two) times daily. , Disp: , Rfl:     furosemide (LASIX) 80 MG tablet, Take 1 tablet (80 mg total) by mouth every morning AND 0.5 tablets (40 mg total) every evening., Disp: 30 tablet, Rfl: 4    potassium chloride SA (K-DUR,KLOR-CON M) 10 MEQ tablet, Take 1 tablet (10 mEq total) by mouth once daily., Disp: 30 tablet, Rfl: 2    spironolactone (ALDACTONE) 25 MG tablet, Take 50 mg by mouth once daily. , Disp: , Rfl:     azithromycin (Z-MORALES) 250 MG tablet, Take 2 tablets by mouth on day 1; Take 1 tablet by mouth on days 2-5 (Patient not taking: Reported  "on 8/1/2023), Disp: 6 tablet, Rfl: 1    benzonatate (TESSALON) 100 MG capsule, Take 1 capsule (100 mg total) by mouth 3 (three) times daily as needed for Cough. (Patient not taking: Reported on 8/1/2023), Disp: 30 capsule, Rfl: 2    guaiFENesin (MUCINEX) 600 mg 12 hr tablet, Take 1 tablet (600 mg total) by mouth 2 (two) times daily as needed for Congestion. (Patient not taking: Reported on 6/2/2023), Disp: 20 tablet, Rfl: 1           Review of Systems   Constitutional:  Negative for appetite change, fatigue and fever.   Respiratory:  Negative for shortness of breath.    Cardiovascular:  Negative for chest pain.   Gastrointestinal:  Negative for abdominal pain and constipation.   Endocrine: Negative for polydipsia, polyphagia and polyuria.   Genitourinary:  Negative for difficulty urinating, frequency and hot flashes.   Allergic/Immunologic: Negative for environmental allergies.   Neurological:  Negative for dizziness and light-headedness.   Psychiatric/Behavioral:  Negative for agitation.                 Vitals:    08/17/23 1016   BP: 112/65   BP Location: Right arm   Patient Position: Sitting   BP Method: Large (Automatic)   Pulse: 89   Resp: 18   Temp: 97.1 °F (36.2 °C)   TempSrc: Temporal   SpO2: 97%   Weight: 100.5 kg (221 lb 9.6 oz)   Height: 5' 11" (1.803 m)        Physical Exam  Vitals and nursing note reviewed.   Constitutional:       Appearance: Normal appearance.   Cardiovascular:      Rate and Rhythm: Normal rate and regular rhythm.      Pulses: Normal pulses.      Heart sounds: Normal heart sounds.   Pulmonary:      Effort: Pulmonary effort is normal.      Breath sounds: Normal breath sounds.   Abdominal:      General: Abdomen is flat. Bowel sounds are normal.      Palpations: Abdomen is soft.   Musculoskeletal:         General: Normal range of motion.   Skin:     General: Skin is warm and dry.   Neurological:      General: No focal deficit present.      Mental Status: She is alert and oriented to " person, place, and time. Mental status is at baseline.           Last Labs:     Office Visit on 08/03/2023   Component Date Value    ProBNP 08/03/2023 120    Office Visit on 08/01/2023   Component Date Value    Case Report 08/01/2023                      Value:Pap Cytology                                      Case: [id number]                                   Authorizing Provider:  Zack Estrada MD   Collected:           08/01/2023 12:59 PM          Ordering Location:     [facility]   Received:            08/02/2023 08:20 AM                                 Clinic - OB/GYN                                                              First Screen:          Maura Fernandez CT(ASCP)                                                   Specimen:    Liquid-Based Pap Test, Screening, Cervix                                                   Interpretation 08/01/2023 Negative     General Categorization 08/01/2023 Negative for intraepithelial lesion or malignancy     Specimen Adequacy 08/01/2023 Satisfactory for evaluation     Clinical Information 08/01/2023                      Value:This result contains rich text formatting which cannot be displayed here.    Disclaimer 08/01/2023                      Value:This result contains rich text formatting which cannot be displayed here.    HPV 16 08/01/2023 Negative     HPV 18 08/01/2023 Negative     HPV Other 08/01/2023 Negative        Last Imaging:  X-Ray Knee 1 or 2 View Right  Narrative: EXAMINATION:  XR KNEE 1 OR 2 VIEW RIGHT    CLINICAL HISTORY:  Unilateral primary osteoarthritis, right knee    COMPARISON:  None    TECHNIQUE:  Frontal and lateral views of the right knee.    FINDINGS:  Mild tricompartmental degenerative change of the knee.  No convincing acute fracture or dislocation demonstrated. No concerning radiopaque foreign body visualized.  Impression: As above.    Point of Service: [facility]    Electronically signed by: Santy  Darinel  Date:    08/17/2023  Time:    11:25         **Labs and x-rays personally reviewed by me    ** reviewed      Objective:        Assessment:       1. Osteoarthritis of right knee, unspecified osteoarthritis type  X-Ray Knee 1 or 2 View Right    Ambulatory referral/consult to Orthopedics      2. Leg edema, right  US Lower Extremity Veins Right           Plan:         [unfilled]

## 2023-09-12 ENCOUNTER — HOSPITAL ENCOUNTER (OUTPATIENT)
Dept: RADIOLOGY | Facility: HOSPITAL | Age: 78
Discharge: HOME | End: 2023-09-12
Attending: INTERNAL MEDICINE
Payer: MEDICARE

## 2023-09-12 ENCOUNTER — HOSPITAL ENCOUNTER (OUTPATIENT)
Dept: RADIOLOGY | Facility: HOSPITAL | Age: 78
Discharge: HOME | End: 2023-09-12
Payer: MEDICARE

## 2023-09-12 DIAGNOSIS — Z78.0 POSTMENOPAUSAL: ICD-10-CM

## 2023-09-12 DIAGNOSIS — Z12.31 VISIT FOR SCREENING MAMMOGRAM: ICD-10-CM

## 2023-09-12 PROCEDURE — 77067 SCR MAMMO BI INCL CAD: CPT | Mod: TC

## 2023-09-12 PROCEDURE — 77067 MAMMO DIGITAL SCREENING BILAT: ICD-10-PCS | Mod: 26,,, | Performed by: RADIOLOGY

## 2023-09-12 PROCEDURE — 77067 SCR MAMMO BI INCL CAD: CPT | Mod: 26,,, | Performed by: RADIOLOGY

## 2023-09-12 PROCEDURE — 77080 DXA BONE DENSITY AXIAL: CPT | Mod: TC

## 2023-09-12 PROCEDURE — 77080 DEXA BONE DENSITY SPINE HIP: ICD-10-PCS | Mod: 26,,, | Performed by: RADIOLOGY

## 2023-09-12 PROCEDURE — 77080 DXA BONE DENSITY AXIAL: CPT | Mod: 26,,, | Performed by: RADIOLOGY

## 2023-09-13 ENCOUNTER — TELEPHONE (OUTPATIENT)
Dept: FAMILY MEDICINE | Facility: CLINIC | Age: 78
End: 2023-09-13
Payer: MEDICARE

## 2023-09-13 NOTE — TELEPHONE ENCOUNTER
----- Message from Cory Monk MD sent at 9/13/2023 12:07 PM CDT -----  Need to see in  1 week please  abnl results     1425 Pt is scheduled to come in on 09/26/23

## 2023-09-26 ENCOUNTER — OFFICE VISIT (OUTPATIENT)
Dept: FAMILY MEDICINE | Facility: CLINIC | Age: 78
End: 2023-09-26
Payer: MEDICARE

## 2023-09-26 VITALS
WEIGHT: 216.19 LBS | RESPIRATION RATE: 17 BRPM | TEMPERATURE: 97 F | HEIGHT: 71 IN | SYSTOLIC BLOOD PRESSURE: 119 MMHG | HEART RATE: 90 BPM | OXYGEN SATURATION: 97 % | BODY MASS INDEX: 30.27 KG/M2 | DIASTOLIC BLOOD PRESSURE: 62 MMHG

## 2023-09-26 DIAGNOSIS — M85.80 OSTEOPENIA, UNSPECIFIED LOCATION: Primary | ICD-10-CM

## 2023-09-26 DIAGNOSIS — I50.9 CONGESTIVE HEART FAILURE, UNSPECIFIED HF CHRONICITY, UNSPECIFIED HEART FAILURE TYPE: ICD-10-CM

## 2023-09-26 PROCEDURE — 99214 OFFICE O/P EST MOD 30 MIN: CPT | Mod: ,,, | Performed by: INTERNAL MEDICINE

## 2023-09-26 PROCEDURE — 99214 PR OFFICE/OUTPT VISIT, EST, LEVL IV, 30-39 MIN: ICD-10-PCS | Mod: ,,, | Performed by: INTERNAL MEDICINE

## 2023-09-26 RX ORDER — SPIRONOLACTONE 25 MG/1
50 TABLET ORAL DAILY
Qty: 90 TABLET | Refills: 1 | Status: SHIPPED | OUTPATIENT
Start: 2023-09-26

## 2023-10-03 PROBLEM — M85.80 OSTEOPENIA: Status: ACTIVE | Noted: 2023-10-03

## 2023-10-03 NOTE — PROGRESS NOTES
Subjective:       Patient ID: Taina Medrano is a 78 y.o. female.    Chief Complaint: Osteoarthritis (Right knee )  Patient seen in follow-up today she is awake alert no acute distress.  Abnormal DEXA scan report is available she does have osteopenia patient informed to take over-the-counter vitamin-D and calcium supplements.  Patient also has congestive heart failure in his out of her Aldactone.  Will refill Aldactone 25 mg 1 p.o. q.day.  Osteoarthritis  Pertinent negatives include no abdominal pain, chest pain, fatigue or fever.     .    Current Medications:    Current Outpatient Medications:     albuterol (PROVENTIL HFA) 90 mcg/actuation inhaler, Inhale 2 puffs into the lungs every 6 (six) hours as needed for Wheezing. Rescue, Disp: 6.7 g, Rfl: 3    benzonatate (TESSALON) 200 MG capsule, Take 1 capsule (200 mg total) by mouth 2 (two) times a day., Disp: 60 capsule, Rfl: 0    carvediloL (COREG) 25 MG tablet, Take 25 mg by mouth 2 (two) times daily with meals. , Disp: , Rfl:     ENTRESTO  mg per tablet, Take 1 tablet by mouth 2 (two) times daily. , Disp: , Rfl:     furosemide (LASIX) 80 MG tablet, Take 1 tablet (80 mg total) by mouth every morning AND 0.5 tablets (40 mg total) every evening., Disp: 30 tablet, Rfl: 4    potassium chloride SA (K-DUR,KLOR-CON M) 10 MEQ tablet, Take 1 tablet (10 mEq total) by mouth once daily., Disp: 30 tablet, Rfl: 2    azithromycin (Z-MORALES) 250 MG tablet, Take 2 tablets by mouth on day 1; Take 1 tablet by mouth on days 2-5 (Patient not taking: Reported on 8/1/2023), Disp: 6 tablet, Rfl: 1    benzonatate (TESSALON) 100 MG capsule, Take 1 capsule (100 mg total) by mouth 3 (three) times daily as needed for Cough. (Patient not taking: Reported on 8/1/2023), Disp: 30 capsule, Rfl: 2    guaiFENesin (MUCINEX) 600 mg 12 hr tablet, Take 1 tablet (600 mg total) by mouth 2 (two) times daily as needed for Congestion. (Patient not taking: Reported on 6/2/2023), Disp: 20 tablet, Rfl: 1     "spironolactone (ALDACTONE) 25 MG tablet, Take 2 tablets (50 mg total) by mouth once daily., Disp: 90 tablet, Rfl: 1           Review of Systems   Constitutional:  Negative for appetite change, fatigue and fever.   Respiratory:  Negative for shortness of breath.    Cardiovascular:  Negative for chest pain.   Gastrointestinal:  Negative for abdominal pain and constipation.   Endocrine: Negative for polydipsia, polyphagia and polyuria.   Genitourinary:  Negative for difficulty urinating, frequency and hot flashes.   Allergic/Immunologic: Negative for environmental allergies.   Neurological:  Negative for dizziness and light-headedness.   Psychiatric/Behavioral:  Negative for agitation.                 Vitals:    09/26/23 0918   BP: 119/62   BP Location: Right arm   Patient Position: Sitting   BP Method: X-Large (Automatic)   Pulse: 90   Resp: 17   Temp: 97 °F (36.1 °C)   TempSrc: Temporal   SpO2: 97%   Weight: 98.1 kg (216 lb 3.2 oz)   Height: 5' 11" (1.803 m)        Physical Exam  Vitals and nursing note reviewed.   Constitutional:       Appearance: Normal appearance.   Cardiovascular:      Rate and Rhythm: Normal rate and regular rhythm.      Pulses: Normal pulses.      Heart sounds: Normal heart sounds.   Pulmonary:      Effort: Pulmonary effort is normal.      Breath sounds: Normal breath sounds.   Abdominal:      General: Abdomen is flat. Bowel sounds are normal.      Palpations: Abdomen is soft.   Musculoskeletal:         General: Normal range of motion.   Skin:     General: Skin is warm and dry.   Neurological:      General: No focal deficit present.      Mental Status: She is alert and oriented to person, place, and time. Mental status is at baseline.           Last Labs:     No visits with results within 1 Month(s) from this visit.   Latest known visit with results is:   Office Visit on 08/03/2023   Component Date Value    ProBNP 08/03/2023 120        Last Imaging:  DXA Bone Density Spine And Hip  Narrative: " EXAMINATION:  DXA BONE DENSITY AXIAL SKELETON 1 OR MORE SITES    CLINICAL HISTORY:  Asymptomatic menopausal state    TECHNIQUE:  Bone densitometry performed    COMPARISON:  None available    FINDINGS:  Vertebral body T score measurements are estimated at -1.0    Femoral neck T score measurements were estimated at -0.4    Estimated 10 year fracture rate 6.0%  Impression: Measurements fall within the osteopenia range  increased fracture risk.    Electronically signed by: Parth Jarrett  Date:    09/12/2023  Time:    14:58  Mammo Digital Screening Bilat  Narrative: Result:  Mammo Digital Screening Bilat    History:  Patient is 78 y.o. and is seen for a screening mammogram.  Previous lumpectomy and radiation therapy for cancer diagnosed in the   right upper outer quadrant in 1991   First child born after age 30     Films Compared:10/20/1997 through 07/13/2022     Findings:   The breasts are heterogeneously dense, which may obscure small masses. No   suspicious masses or microcalcifications are seen. No interval changes   have occurred. A pacemaker terminal projects over the left upper chest   wall.     A breast examination was performed, and no clinically suspicious palpable   masses were present.   Impression:    No radiographic evidence of malignancy. Routine screening mammograms are   recommended.    BI-RADS Category 1: Negative    Recommendation:  Routine screening mammogram in 1 year is recommended.         **Labs and x-rays personally reviewed by me    ** reviewed      Objective:        Assessment:       1. Osteopenia, unspecified location        2. Congestive heart failure, unspecified HF chronicity, unspecified heart failure type             Plan:         [unfilled]

## 2023-10-24 ENCOUNTER — OFFICE VISIT (OUTPATIENT)
Dept: PULMONOLOGY | Facility: CLINIC | Age: 78
End: 2023-10-24
Payer: MEDICARE

## 2023-10-24 VITALS
HEIGHT: 71 IN | DIASTOLIC BLOOD PRESSURE: 62 MMHG | RESPIRATION RATE: 18 BRPM | BODY MASS INDEX: 30.24 KG/M2 | HEART RATE: 74 BPM | WEIGHT: 216 LBS | OXYGEN SATURATION: 98 % | SYSTOLIC BLOOD PRESSURE: 125 MMHG

## 2023-10-24 DIAGNOSIS — J90 PLEURAL EFFUSION: ICD-10-CM

## 2023-10-24 DIAGNOSIS — R05.3 CHRONIC COUGH: Primary | Chronic | ICD-10-CM

## 2023-10-24 PROCEDURE — 99202 PR OFFICE/OUTPT VISIT, NEW, LEVL II, 15-29 MIN: ICD-10-PCS | Mod: S$PBB,,, | Performed by: STUDENT IN AN ORGANIZED HEALTH CARE EDUCATION/TRAINING PROGRAM

## 2023-10-24 PROCEDURE — 99202 OFFICE O/P NEW SF 15 MIN: CPT | Mod: S$PBB,,, | Performed by: STUDENT IN AN ORGANIZED HEALTH CARE EDUCATION/TRAINING PROGRAM

## 2023-10-24 PROCEDURE — 99214 OFFICE O/P EST MOD 30 MIN: CPT | Mod: PBBFAC | Performed by: STUDENT IN AN ORGANIZED HEALTH CARE EDUCATION/TRAINING PROGRAM

## 2023-10-24 RX ORDER — FLUTICASONE PROPIONATE 50 MCG
1 SPRAY, SUSPENSION (ML) NASAL DAILY
Qty: 16 G | Refills: 5 | Status: SHIPPED | OUTPATIENT
Start: 2023-10-24

## 2023-10-24 NOTE — ASSESSMENT & PLAN NOTE
No evidence of pleural effusion on HRCT or CXR. This problem will be resolved following this encounter.

## 2023-10-24 NOTE — ASSESSMENT & PLAN NOTE
79 yo F lifetime non smoker presents with history consistent with chronic cough a/w post nasal drip. History also notable for chronic sinusitis persistent following surgery. HRCT with no lower respiratory findings; she has normal appearing lungs with no airway disease, pulmonary edema or pleural effusion. She denies any defibrillation events with ICD. Of note, pericardial effusion is present. Plan for management as follows:  - will obtain TTE records  - start flonase daily; she was instructed on proper use of intranasal spray  - will consider PFT on follow up for persistent cough  - ok to continue JAVIER PRN

## 2023-10-24 NOTE — PROGRESS NOTES
[facility]  Pulmonology  NEW VISIT     Patient Name:  Taina Medrano  Primary Care Provider: Cory Monk MD  Date of Service: 10/24/2023  Reason for Referral: Pleural Effusion      Chief Complaint: Cough    SUBJECTIVE   HPI:  Taina Medrano is a 78 y.o. female with heart failure s/p CRT who presents today upon referral with complaints of cough.      reports a cough that is typically productive of clear sputum and sometimes yellow. She has long history of sinusitis and had surgeries in the past. She has post nasal drip that is long standing. She has had no hospitalizations for respiratory symptoms.    Of note, she had a HRCT that was notable for a pericardial effusion. She is established with cardiology and reports annual Echos with last ultrasound in 06/2023. She has had no defibrillation events since ICD placement.    Past Medical History:   Diagnosis Date    Asthma     Breast cancer     Cancer     MI, old        Past Surgical History:   Procedure Laterality Date    BREAST LUMPECTOMY Right     BREAST SURGERY      SINUS SURGERY         Family History   Problem Relation Age of Onset    Cancer Sister     Cancer Brother     Hypertension Brother         Social History     Socioeconomic History    Marital status: Single   Tobacco Use    Smoking status: Never     Passive exposure: Never    Smokeless tobacco: Never   Substance and Sexual Activity    Alcohol use: Never    Drug use: Never    Sexual activity: Not Currently     Partners: Male       Social History     Social History Narrative    Not on file       Review of patient's allergies indicates:  Not on File     Medications: Medications reviewed to include over the counter medications.    Review of Systems: A 10 point ROS was completed and found to be negative except for that mentioned above.      OBJECTIVE   PHYSICAL EXAM:  Vitals:    10/24/23 1356   BP: 125/62   BP Location: Left arm   Patient Position: Sitting   BP Method: Large (Automatic)  "  Pulse: 74   Resp: 18   SpO2: 98%   Weight: 98 kg (216 lb)   Height: 5' 11" (1.803 m)        GENERAL: NAD  HEENT: normocephalic, non-icteric conjunctivae, moist oral mucosa  RESPIRATORY: clear to auscultation, no wheezing, rales or rhonchi  CARDIOVASCULAR: Regular rate and rhythm, no murmurs rubs or gallops.  SKIN: no rash, jaundice, ecchymosis or ulcers in exposed skin  MUSCULOSKELETAL: No clubbing or cyanosis; no pedal edema  NEUROLOGIC: AO ×3, no gross deficits    LABS:  Lab studies reviewed and notable for serial NT proBNP unremarkable    IMAGING:  Imaging reviewed and notable for HRCT 06/2023 with no lung noddules, GGO or fibrotic changes; pericardial effusion present (Radiologist read small-mod)    LUNG FUNCTION TESTING:  None available to review or report      SPIROMETRY/PFT:      ASSESSMENT & PLAN     1. Chronic cough  Assessment & Plan:  77 yo F lifetime non smoker presents with history consistent with chronic cough a/w post nasal drip. History also notable for chronic sinusitis persistent following surgery. HRCT with no lower respiratory findings; she has normal appearing lungs with no airway disease, pulmonary edema or pleural effusion. She denies any defibrillation events with ICD. Of note, pericardial effusion is present. Plan for management as follows:  - will obtain TTE records  - start flonase daily; she was instructed on proper use of intranasal spray  - will consider PFT on follow up for persistent cough  - ok to continue JAVIER PRN    Orders:  -     fluticasone propionate (FLONASE) 50 mcg/actuation nasal spray; 1 spray (50 mcg total) by Each Nostril route once daily.  Dispense: 16 g; Refill: 5    2. Pleural effusion  Assessment & Plan:  No evidence of pleural effusion on HRCT or CXR. This problem will be resolved following this encounter.     Orders:  -     Ambulatory referral/consult to Pulmonology           Follow up in about 3 months (around 1/24/2024) for Routine follow up.      Case was " discussed with patient; all questions were answered to patient's satisfaction and patient verbalized understanding.     Torie Ken MD  Pulmonary Medicine  [facility]  Phone: [phone]

## 2023-11-20 ENCOUNTER — APPOINTMENT (OUTPATIENT)
Dept: RADIOLOGY | Facility: CLINIC | Age: 78
End: 2023-11-20
Attending: INTERNAL MEDICINE
Payer: MEDICARE

## 2023-11-20 ENCOUNTER — OFFICE VISIT (OUTPATIENT)
Dept: FAMILY MEDICINE | Facility: CLINIC | Age: 78
End: 2023-11-20
Payer: MEDICARE

## 2023-11-20 VITALS
RESPIRATION RATE: 18 BRPM | DIASTOLIC BLOOD PRESSURE: 77 MMHG | HEIGHT: 71 IN | WEIGHT: 214 LBS | OXYGEN SATURATION: 95 % | BODY MASS INDEX: 29.96 KG/M2 | SYSTOLIC BLOOD PRESSURE: 124 MMHG | HEART RATE: 88 BPM | TEMPERATURE: 98 F

## 2023-11-20 DIAGNOSIS — J40 BRONCHITIS: Primary | ICD-10-CM

## 2023-11-20 DIAGNOSIS — J40 BRONCHITIS: ICD-10-CM

## 2023-11-20 LAB
BASOPHILS # BLD AUTO: 0.03 K/UL (ref 0–0.2)
BASOPHILS NFR BLD AUTO: 0.5 % (ref 0–1)
DIFFERENTIAL METHOD BLD: ABNORMAL
EOSINOPHIL # BLD AUTO: 0.16 K/UL (ref 0–0.5)
EOSINOPHIL NFR BLD AUTO: 2.4 % (ref 1–4)
ERYTHROCYTE [DISTWIDTH] IN BLOOD BY AUTOMATED COUNT: 14 % (ref 11.5–14.5)
HCT VFR BLD AUTO: 36.3 % (ref 38–47)
HGB BLD-MCNC: 11.9 G/DL (ref 12–16)
IMM GRANULOCYTES # BLD AUTO: 0.02 K/UL (ref 0–0.04)
IMM GRANULOCYTES NFR BLD: 0.3 % (ref 0–0.4)
LYMPHOCYTES # BLD AUTO: 1.96 K/UL (ref 1–4.8)
LYMPHOCYTES NFR BLD AUTO: 29.9 % (ref 27–41)
MCH RBC QN AUTO: 30.7 PG (ref 27–31)
MCHC RBC AUTO-ENTMCNC: 32.8 G/DL (ref 32–36)
MCV RBC AUTO: 93.8 FL (ref 80–96)
MONOCYTES # BLD AUTO: 0.68 K/UL (ref 0–0.8)
MONOCYTES NFR BLD AUTO: 10.4 % (ref 2–6)
MPC BLD CALC-MCNC: 11.3 FL (ref 9.4–12.4)
NEUTROPHILS # BLD AUTO: 3.7 K/UL (ref 1.8–7.7)
NEUTROPHILS NFR BLD AUTO: 56.5 % (ref 53–65)
NRBC # BLD AUTO: 0 X10E3/UL
NRBC, AUTO (.00): 0 %
PLATELET # BLD AUTO: 238 K/UL (ref 150–400)
RBC # BLD AUTO: 3.87 M/UL (ref 4.2–5.4)
WBC # BLD AUTO: 6.55 K/UL (ref 4.5–11)

## 2023-11-20 PROCEDURE — 99213 OFFICE O/P EST LOW 20 MIN: CPT | Mod: ,,, | Performed by: INTERNAL MEDICINE

## 2023-11-20 PROCEDURE — 71046 X-RAY EXAM CHEST 2 VIEWS: CPT | Mod: TC,RHCUB | Performed by: INTERNAL MEDICINE

## 2023-11-20 PROCEDURE — 85025 COMPLETE CBC W/AUTO DIFF WBC: CPT | Mod: ,,, | Performed by: CLINICAL MEDICAL LABORATORY

## 2023-11-20 PROCEDURE — 99213 PR OFFICE/OUTPT VISIT, EST, LEVL III, 20-29 MIN: ICD-10-PCS | Mod: ,,, | Performed by: INTERNAL MEDICINE

## 2023-11-20 PROCEDURE — 85025 CBC WITH DIFFERENTIAL: ICD-10-PCS | Mod: ,,, | Performed by: CLINICAL MEDICAL LABORATORY

## 2023-11-20 RX ORDER — AMOXICILLIN AND CLAVULANATE POTASSIUM 500; 125 MG/1; MG/1
1 TABLET, FILM COATED ORAL 2 TIMES DAILY
Qty: 14 TABLET | Refills: 0 | Status: SHIPPED | OUTPATIENT
Start: 2023-11-20

## 2023-11-20 RX ORDER — ALBUTEROL SULFATE 90 UG/1
2 AEROSOL, METERED RESPIRATORY (INHALATION) EVERY 6 HOURS PRN
Qty: 6.7 G | Refills: 3 | Status: SHIPPED | OUTPATIENT
Start: 2023-11-20

## 2023-11-20 RX ORDER — GUAIFENESIN 600 MG/1
600 TABLET, EXTENDED RELEASE ORAL 2 TIMES DAILY
Qty: 14 TABLET | Refills: 0 | Status: SHIPPED | OUTPATIENT
Start: 2023-11-20

## 2023-11-22 ENCOUNTER — OFFICE VISIT (OUTPATIENT)
Dept: FAMILY MEDICINE | Facility: CLINIC | Age: 78
End: 2023-11-22
Payer: MEDICARE

## 2023-11-22 VITALS
HEART RATE: 89 BPM | HEIGHT: 71 IN | TEMPERATURE: 98 F | BODY MASS INDEX: 30.71 KG/M2 | OXYGEN SATURATION: 95 % | WEIGHT: 219.38 LBS | RESPIRATION RATE: 18 BRPM | DIASTOLIC BLOOD PRESSURE: 77 MMHG | SYSTOLIC BLOOD PRESSURE: 124 MMHG

## 2023-11-22 DIAGNOSIS — J40 BRONCHITIS: Primary | ICD-10-CM

## 2023-11-22 PROCEDURE — 99212 PR OFFICE/OUTPT VISIT, EST, LEVL II, 10-19 MIN: ICD-10-PCS | Mod: ,,, | Performed by: INTERNAL MEDICINE

## 2023-11-22 PROCEDURE — 99212 OFFICE O/P EST SF 10 MIN: CPT | Mod: ,,, | Performed by: INTERNAL MEDICINE

## 2023-11-22 NOTE — PROGRESS NOTES
Subjective:       Patient ID: Taina Medrano is a 78 y.o. female.    Chief Complaint: Asthma  Patient presents with a history of asthma short of breath she is coughing coughing up yellowish sputum.  Lung exam diffuse wheezing this is an acute bronchitis.    The plan PA and lateral chest x-ray CBC to check the white blood cell count Augmentin 500 mg 1 p.o. b.i.d. and guaifenesin 600 mg 1 p.o. b.i.d.  Asthma  She complains of cough. There is no shortness of breath. Pertinent negatives include no appetite change, chest pain or fever. Her past medical history is significant for asthma.     .    Current Medications:    Current Outpatient Medications:     carvediloL (COREG) 25 MG tablet, Take 25 mg by mouth 2 (two) times daily with meals. , Disp: , Rfl:     ENTRESTO  mg per tablet, Take 1 tablet by mouth 2 (two) times daily. , Disp: , Rfl:     fluticasone propionate (FLONASE) 50 mcg/actuation nasal spray, 1 spray (50 mcg total) by Each Nostril route once daily., Disp: 16 g, Rfl: 5    furosemide (LASIX) 80 MG tablet, Take 1 tablet (80 mg total) by mouth every morning AND 0.5 tablets (40 mg total) every evening., Disp: 30 tablet, Rfl: 4    potassium chloride SA (K-DUR,KLOR-CON M) 10 MEQ tablet, Take 1 tablet (10 mEq total) by mouth once daily., Disp: 30 tablet, Rfl: 2    spironolactone (ALDACTONE) 25 MG tablet, Take 2 tablets (50 mg total) by mouth once daily., Disp: 90 tablet, Rfl: 1    albuterol (PROVENTIL HFA) 90 mcg/actuation inhaler, Inhale 2 puffs into the lungs every 6 (six) hours as needed for Wheezing. Rescue, Disp: 6.7 g, Rfl: 3    amoxicillin-clavulanate 500-125mg (AUGMENTIN) 500-125 mg Tab, Take 1 tablet (500 mg total) by mouth 2 (two) times daily., Disp: 14 tablet, Rfl: 0    guaiFENesin (MUCINEX) 600 mg 12 hr tablet, Take 1 tablet (600 mg total) by mouth 2 (two) times daily., Disp: 14 tablet, Rfl: 0           Review of Systems   Constitutional:  Negative for appetite change, fatigue and fever.  "  Respiratory:  Positive for cough. Negative for shortness of breath.    Cardiovascular:  Negative for chest pain.   Gastrointestinal:  Negative for abdominal pain and constipation.   Endocrine: Negative for polydipsia, polyphagia and polyuria.   Genitourinary:  Negative for difficulty urinating, frequency and hot flashes.   Allergic/Immunologic: Negative for environmental allergies.   Neurological:  Negative for dizziness and light-headedness.   Psychiatric/Behavioral:  Negative for agitation.                 Vitals:    11/20/23 1033   BP: 124/77   BP Location: Left arm   Patient Position: Sitting   BP Method: Large (Automatic)   Pulse: 88   Resp: 18   Temp: 97.6 °F (36.4 °C)   TempSrc: Temporal   SpO2: 95%   Weight: 97.1 kg (214 lb)   Height: 5' 11" (1.803 m)        Physical Exam  Vitals and nursing note reviewed.   Constitutional:       Appearance: Normal appearance.   HENT:      Head: Normocephalic and atraumatic.      Right Ear: Tympanic membrane, ear canal and external ear normal.      Left Ear: Tympanic membrane, ear canal and external ear normal.      Nose: Nose normal.      Mouth/Throat:      Mouth: Mucous membranes are moist.      Pharynx: Oropharynx is clear.   Eyes:      Extraocular Movements: Extraocular movements intact.      Conjunctiva/sclera: Conjunctivae normal.      Pupils: Pupils are equal, round, and reactive to light.   Cardiovascular:      Rate and Rhythm: Normal rate and regular rhythm.      Pulses: Normal pulses.      Heart sounds: Normal heart sounds.   Pulmonary:      Effort: Pulmonary effort is normal.      Breath sounds: Wheezing present.   Abdominal:      General: Abdomen is flat. Bowel sounds are normal.      Palpations: Abdomen is soft.   Musculoskeletal:         General: Normal range of motion.      Cervical back: Normal range of motion and neck supple.   Skin:     General: Skin is warm and dry.   Neurological:      General: No focal deficit present.      Mental Status: She is alert. " Mental status is at baseline. She is disoriented.   Psychiatric:         Mood and Affect: Mood normal.         Behavior: Behavior normal.         Thought Content: Thought content normal.         Judgment: Judgment normal.           Last Labs:     Office Visit on 11/20/2023   Component Date Value    WBC 11/20/2023 6.55     RBC 11/20/2023 3.87 (L)     Hemoglobin 11/20/2023 11.9 (L)     Hematocrit 11/20/2023 36.3 (L)     MCV 11/20/2023 93.8     MCH 11/20/2023 30.7     MCHC 11/20/2023 32.8     RDW 11/20/2023 14.0     Platelet Count 11/20/2023 238     MPV 11/20/2023 11.3     Neutrophils % 11/20/2023 56.5     Lymphocytes % 11/20/2023 29.9     Monocytes % 11/20/2023 10.4 (H)     Eosinophils % 11/20/2023 2.4     Basophils % 11/20/2023 0.5     Immature Granulocytes % 11/20/2023 0.3     nRBC, Auto 11/20/2023 0.0     Neutrophils, Abs 11/20/2023 3.70     Lymphocytes, Absolute 11/20/2023 1.96     Monocytes, Absolute 11/20/2023 0.68     Eosinophils, Absolute 11/20/2023 0.16     Basophils, Absolute 11/20/2023 0.03     Immature Granulocytes, A* 11/20/2023 0.02     nRBC, Absolute 11/20/2023 0.00     Diff Type 11/20/2023 Auto        Last Imaging:  X-Ray Chest PA And Lateral  Narrative: EXAMINATION:  XR CHEST PA AND LATERAL    CLINICAL HISTORY:  Bronchitis, not specified as acute or chronic    COMPARISON:  Chest x-ray June 13, 2023    TECHNIQUE:  Frontal and lateral views of the chest.    FINDINGS:  The cardiomediastinal silhouette is stable in configuration.  Cardiac conduction device again demonstrated.  Chronic change of the lungs without focal consolidation, pleural effusion, or pneumothorax.  Visualized osseous and surrounding soft tissue structures appear grossly unchanged.  Impression: Stable chest x-ray without acute cardiopulmonary process demonstrated.    Point of Service: [facility]    Electronically signed by: Santy Tena  Date:    11/20/2023  Time:    11:47         **Labs and x-rays personally reviewed by  me    ** reviewed      Objective:        Assessment:       1. Bronchitis  CBC Auto Differential    X-Ray Chest PA And Lateral    CBC Auto Differential           Plan:         1. Bronchitis  -     CBC Auto Differential; Future; Expected date: 11/20/2023  -     X-Ray Chest PA And Lateral; Future; Expected date: 11/20/2023    Other orders  -     albuterol (PROVENTIL HFA) 90 mcg/actuation inhaler; Inhale 2 puffs into the lungs every 6 (six) hours as needed for Wheezing. Rescue  Dispense: 6.7 g; Refill: 3  -     amoxicillin-clavulanate 500-125mg (AUGMENTIN) 500-125 mg Tab; Take 1 tablet (500 mg total) by mouth 2 (two) times daily.  Dispense: 14 tablet; Refill: 0  -     guaiFENesin (MUCINEX) 600 mg 12 hr tablet; Take 1 tablet (600 mg total) by mouth 2 (two) times daily.  Dispense: 14 tablet; Refill: 0

## 2023-11-23 NOTE — PROGRESS NOTES
Subjective:       Patient ID: Taina Medrano is a 78 y.o. female.    Chief Complaint: Bronchitis    HPI  .  Patient presents in follow up today she is in no acute distress.  Cough is better breathing is better lungs are completely clear patient is medically stable  Current Medications:    Current Outpatient Medications:     albuterol (PROVENTIL HFA) 90 mcg/actuation inhaler, Inhale 2 puffs into the lungs every 6 (six) hours as needed for Wheezing. Rescue, Disp: 6.7 g, Rfl: 3    amoxicillin-clavulanate 500-125mg (AUGMENTIN) 500-125 mg Tab, Take 1 tablet (500 mg total) by mouth 2 (two) times daily., Disp: 14 tablet, Rfl: 0    carvediloL (COREG) 25 MG tablet, Take 25 mg by mouth 2 (two) times daily with meals. , Disp: , Rfl:     ENTRESTO  mg per tablet, Take 1 tablet by mouth 2 (two) times daily. , Disp: , Rfl:     fluticasone propionate (FLONASE) 50 mcg/actuation nasal spray, 1 spray (50 mcg total) by Each Nostril route once daily., Disp: 16 g, Rfl: 5    furosemide (LASIX) 80 MG tablet, Take 1 tablet (80 mg total) by mouth every morning AND 0.5 tablets (40 mg total) every evening., Disp: 30 tablet, Rfl: 4    guaiFENesin (MUCINEX) 600 mg 12 hr tablet, Take 1 tablet (600 mg total) by mouth 2 (two) times daily., Disp: 14 tablet, Rfl: 0    potassium chloride SA (K-DUR,KLOR-CON M) 10 MEQ tablet, Take 1 tablet (10 mEq total) by mouth once daily., Disp: 30 tablet, Rfl: 2    spironolactone (ALDACTONE) 25 MG tablet, Take 2 tablets (50 mg total) by mouth once daily., Disp: 90 tablet, Rfl: 1           Review of Systems   Constitutional:  Negative for appetite change, fatigue and fever.   Respiratory:  Negative for shortness of breath.    Cardiovascular:  Negative for chest pain.   Gastrointestinal:  Negative for abdominal pain and constipation.   Endocrine: Negative for polydipsia, polyphagia and polyuria.   Genitourinary:  Negative for difficulty urinating, frequency and hot flashes.   Allergic/Immunologic: Negative for  "environmental allergies.   Neurological:  Negative for dizziness and light-headedness.   Psychiatric/Behavioral:  Negative for agitation.                 Vitals:    11/22/23 0849   BP: 124/77   BP Location: Right arm   Patient Position: Sitting   BP Method: Large (Automatic)   Pulse: 89   Resp: 18   Temp: 97.8 °F (36.6 °C)   TempSrc: Temporal   SpO2: 95%   Weight: 99.5 kg (219 lb 6.4 oz)   Height: 5' 11" (1.803 m)        Physical Exam  Vitals and nursing note reviewed.   Constitutional:       Appearance: Normal appearance.   HENT:      Head: Normocephalic and atraumatic.      Right Ear: Tympanic membrane, ear canal and external ear normal.      Left Ear: Tympanic membrane, ear canal and external ear normal.      Nose: Nose normal.      Mouth/Throat:      Mouth: Mucous membranes are moist.      Pharynx: Oropharynx is clear.   Eyes:      Extraocular Movements: Extraocular movements intact.      Conjunctiva/sclera: Conjunctivae normal.      Pupils: Pupils are equal, round, and reactive to light.   Cardiovascular:      Rate and Rhythm: Normal rate and regular rhythm.      Pulses: Normal pulses.      Heart sounds: Normal heart sounds.   Pulmonary:      Effort: Pulmonary effort is normal.      Breath sounds: Normal breath sounds.   Abdominal:      General: Abdomen is flat. Bowel sounds are normal.      Palpations: Abdomen is soft.   Musculoskeletal:         General: Normal range of motion.      Cervical back: Normal range of motion and neck supple.   Skin:     General: Skin is warm and dry.   Neurological:      General: No focal deficit present.      Mental Status: She is alert. Mental status is at baseline. She is disoriented.   Psychiatric:         Mood and Affect: Mood normal.         Behavior: Behavior normal.         Thought Content: Thought content normal.         Judgment: Judgment normal.           Last Labs:     Office Visit on 11/20/2023   Component Date Value    WBC 11/20/2023 6.55     RBC 11/20/2023 3.87 (L)  "    Hemoglobin 11/20/2023 11.9 (L)     Hematocrit 11/20/2023 36.3 (L)     MCV 11/20/2023 93.8     MCH 11/20/2023 30.7     MCHC 11/20/2023 32.8     RDW 11/20/2023 14.0     Platelet Count 11/20/2023 238     MPV 11/20/2023 11.3     Neutrophils % 11/20/2023 56.5     Lymphocytes % 11/20/2023 29.9     Monocytes % 11/20/2023 10.4 (H)     Eosinophils % 11/20/2023 2.4     Basophils % 11/20/2023 0.5     Immature Granulocytes % 11/20/2023 0.3     nRBC, Auto 11/20/2023 0.0     Neutrophils, Abs 11/20/2023 3.70     Lymphocytes, Absolute 11/20/2023 1.96     Monocytes, Absolute 11/20/2023 0.68     Eosinophils, Absolute 11/20/2023 0.16     Basophils, Absolute 11/20/2023 0.03     Immature Granulocytes, A* 11/20/2023 0.02     nRBC, Absolute 11/20/2023 0.00     Diff Type 11/20/2023 Auto        Last Imaging:  X-Ray Chest PA And Lateral  Narrative: EXAMINATION:  XR CHEST PA AND LATERAL    CLINICAL HISTORY:  Bronchitis, not specified as acute or chronic    COMPARISON:  Chest x-ray June 13, 2023    TECHNIQUE:  Frontal and lateral views of the chest.    FINDINGS:  The cardiomediastinal silhouette is stable in configuration.  Cardiac conduction device again demonstrated.  Chronic change of the lungs without focal consolidation, pleural effusion, or pneumothorax.  Visualized osseous and surrounding soft tissue structures appear grossly unchanged.  Impression: Stable chest x-ray without acute cardiopulmonary process demonstrated.    Point of Service: [facility]    Electronically signed by: Santy Tena  Date:    11/20/2023  Time:    11:47         **Labs and x-rays personally reviewed by me    ** reviewed      Objective:        Assessment:       1. Bronchitis  Much improved.           Plan:         1. Bronchitis

## 2023-11-30 RX ORDER — POTASSIUM CHLORIDE 750 MG/1
10 TABLET, EXTENDED RELEASE ORAL
Qty: 30 TABLET | Refills: 3 | Status: SHIPPED | OUTPATIENT
Start: 2023-11-30

## 2023-12-09 DIAGNOSIS — Z71.89 COMPLEX CARE COORDINATION: ICD-10-CM

## 2024-01-04 RX ORDER — FUROSEMIDE 80 MG/1
TABLET ORAL
Qty: 30 TABLET | Refills: 4 | Status: SHIPPED | OUTPATIENT
Start: 2024-01-04

## 2024-05-28 NOTE — PROGRESS NOTES
"  Taina Medrano presented for a  Medicare AWV and comprehensive Health Risk Assessment today. She is an established pt of Dr. Cory Monk.    The following components were reviewed and updated:    Medical history  Family History  Social history  Allergies and Current Medications  Health Risk Assessment  Health Maintenance  Care Team         ** See Completed Assessments for Annual Wellness Visit within the encounter summary.**         The following assessments were completed:  Living Situation  CAGE  Depression Screening  Timed Get Up and Go  Whisper Test  Cognitive Function Screening  Nutrition Screening  ADL Screening  PAQ Screening      Opioid documentation:      Patient does not have a current opioid prescription.        Vitals:    06/03/24 0825   BP: 102/60   BP Location: Right arm   Patient Position: Sitting   Pulse: 74   Resp: 16   Temp: 97.8 °F (36.6 °C)   TempSrc: Oral   SpO2: 97%   Weight: 100.2 kg (221 lb)   Height: 5' 11" (1.803 m)     Body mass index is 30.82 kg/m².  Physical Exam  Constitutional:       General: She is not in acute distress.     Appearance: Normal appearance. She is obese.   HENT:      Head: Normocephalic.      Mouth/Throat:      Mouth: Mucous membranes are moist.   Cardiovascular:      Rate and Rhythm: Normal rate and regular rhythm.      Pulses: Normal pulses.      Heart sounds: Normal heart sounds.   Pulmonary:      Effort: Pulmonary effort is normal. No respiratory distress.      Breath sounds: Normal breath sounds.   Musculoskeletal:         General: Normal range of motion.      Cervical back: Neck supple.   Skin:     General: Skin is warm and dry.   Neurological:      Mental Status: She is alert and oriented to person, place, and time.   Psychiatric:         Mood and Affect: Mood normal.         Behavior: Behavior normal.             Diagnoses and health risks identified today and associated recommendations/orders:    1. Encounter for subsequent annual wellness visit (AWV) in Medicare " patient  Screenings performed, as noted above. Personal preventative testing needs reviewed.  Return for AWV in 12 months or thereafter.     2. Primary hypertension  Chronic - Stable  B/P 102/60 today  Continue carvedilol 25 mg po BID  Continue entresto  mg po BID  Continue heart healthy diet intake  Followed by PCP    3. Hyperlipidemia, unspecified hyperlipidemia type  Chronic - Stable  Lipid - Trig 96, Chol 168, HDL 61, LDL 88  Continue heart healthy diet intake  Followed by PCP  - Lipid Panel; Future  - Lipid Panel    4. Presence of implantable cardioverter-defibrillator (ICD)  Stable  Followed by ZAKIA Meraz (Cardiiology)    5. Venous insufficiency  Stable  Followed by PCP    6. Congestive heart failure, unspecified HF chronicity, unspecified heart failure type  Stable  Followed by ZAKIA Meraz (Cardiiology)  Continue carvedilol 25 mg po BID  Continue entresto  mg po BID    7. BMI 30.0-30.9,adult  - encouraged heart healthy diet and some form of daily exercise for at least 30 minutes     8. Obesity, unspecified classification, unspecified obesity type, unspecified whether serious comorbidity present  - encouraged heart healthy diet and some form of daily exercise for at least 30 minutes   - Basic Metabolic Panel; Future  - Basic Metabolic Panel      Provided Taina with a 5-10 year written screening schedule and personal prevention plan. Recommendations were developed using the USPSTF age appropriate recommendations. Education, counseling, and referrals were provided as needed. After Visit Summary printed and given to patient which includes a list of additional screenings\tests needed.    Follow up in about 1 year (around 6/3/2025).    WILLI Barbour    Covid vaccine - will take at pharmacy, Tetanus vaccine - declined, Shingles vaccine- declined, RSV vaccine - declined-VIS given, Lipid panel - ordered this visit, BMP - ordered this visit.    I offered to discuss advanced care planning, including  how to pick a person who would make decisions for you if you were unable to make them for yourself, called a health care power of , and what kind of decisions you might make such as use of life sustaining treatments such as ventilators and tube feeding when faced with a life limiting illness recorded on a living will that they will need to know. (How you want to be cared for as you near the end of your natural life)     X Patient is interested in learning more about how to make advanced directives.  I provided them paperwork and offered to discuss this with them.

## 2024-06-03 ENCOUNTER — OFFICE VISIT (OUTPATIENT)
Dept: FAMILY MEDICINE | Facility: CLINIC | Age: 79
End: 2024-06-03
Payer: MEDICARE

## 2024-06-03 VITALS
SYSTOLIC BLOOD PRESSURE: 102 MMHG | TEMPERATURE: 98 F | HEART RATE: 74 BPM | WEIGHT: 221 LBS | OXYGEN SATURATION: 97 % | HEIGHT: 71 IN | DIASTOLIC BLOOD PRESSURE: 60 MMHG | RESPIRATION RATE: 16 BRPM | BODY MASS INDEX: 30.94 KG/M2

## 2024-06-03 DIAGNOSIS — E66.9 OBESITY, UNSPECIFIED CLASSIFICATION, UNSPECIFIED OBESITY TYPE, UNSPECIFIED WHETHER SERIOUS COMORBIDITY PRESENT: ICD-10-CM

## 2024-06-03 DIAGNOSIS — I87.2 VENOUS INSUFFICIENCY: ICD-10-CM

## 2024-06-03 DIAGNOSIS — I50.9 CONGESTIVE HEART FAILURE, UNSPECIFIED HF CHRONICITY, UNSPECIFIED HEART FAILURE TYPE: ICD-10-CM

## 2024-06-03 DIAGNOSIS — E78.5 HYPERLIPIDEMIA, UNSPECIFIED HYPERLIPIDEMIA TYPE: ICD-10-CM

## 2024-06-03 DIAGNOSIS — I10 PRIMARY HYPERTENSION: ICD-10-CM

## 2024-06-03 DIAGNOSIS — Z00.00 ENCOUNTER FOR SUBSEQUENT ANNUAL WELLNESS VISIT (AWV) IN MEDICARE PATIENT: Primary | ICD-10-CM

## 2024-06-03 DIAGNOSIS — Z95.810 PRESENCE OF IMPLANTABLE CARDIOVERTER-DEFIBRILLATOR (ICD): ICD-10-CM

## 2024-06-03 LAB
ANION GAP SERPL CALCULATED.3IONS-SCNC: 10 MMOL/L (ref 7–16)
BUN SERPL-MCNC: 25 MG/DL (ref 7–18)
BUN/CREAT SERPL: 18 (ref 6–20)
CALCIUM SERPL-MCNC: 9.3 MG/DL (ref 8.5–10.1)
CHLORIDE SERPL-SCNC: 107 MMOL/L (ref 98–107)
CHOLEST SERPL-MCNC: 181 MG/DL (ref 0–200)
CHOLEST/HDLC SERPL: 3.2 {RATIO}
CO2 SERPL-SCNC: 28 MMOL/L (ref 21–32)
CREAT SERPL-MCNC: 1.4 MG/DL (ref 0.55–1.02)
EGFR (NO RACE VARIABLE) (RUSH/TITUS): 38 ML/MIN/1.73M2
GLUCOSE SERPL-MCNC: 100 MG/DL (ref 74–106)
HDLC SERPL-MCNC: 56 MG/DL (ref 40–60)
LDLC SERPL CALC-MCNC: 102 MG/DL
LDLC/HDLC SERPL: 1.8 {RATIO}
NONHDLC SERPL-MCNC: 125 MG/DL
POTASSIUM SERPL-SCNC: 4 MMOL/L (ref 3.5–5.1)
SODIUM SERPL-SCNC: 141 MMOL/L (ref 136–145)
TRIGL SERPL-MCNC: 113 MG/DL (ref 35–150)
VLDLC SERPL-MCNC: 23 MG/DL

## 2024-06-03 PROCEDURE — G0439 PPPS, SUBSEQ VISIT: HCPCS | Mod: ,,, | Performed by: NURSE PRACTITIONER

## 2024-06-03 PROCEDURE — 80061 LIPID PANEL: CPT | Mod: ,,, | Performed by: CLINICAL MEDICAL LABORATORY

## 2024-06-03 PROCEDURE — 80048 BASIC METABOLIC PNL TOTAL CA: CPT | Mod: ,,, | Performed by: CLINICAL MEDICAL LABORATORY

## 2024-06-03 NOTE — PATIENT INSTRUCTIONS
Counseling and Referral of Other Preventative  (Italic type indicates deductible and co-insurance are waived)    Patient Name: Taina Medrano  Today's Date: 6/3/2024    Health Maintenance       Date Due Completion Date    RSV Vaccine (Age 60+ and Pregnant patients) (1 - 1-dose 60+ series) Never done ---    COVID-19 Vaccine (5 - 2023-24 season) 09/01/2023 5/13/2022    Shingles Vaccine (1 of 2) 08/03/2024 (Originally 2/8/1995) ---    TETANUS VACCINE 11/20/2024 (Originally 2/8/1963) ---    Influenza Vaccine (Season Ended) 09/01/2024 12/22/2022    DEXA Scan 09/12/2026 9/12/2023    Lipid Panel 06/02/2028 6/2/2023        No orders of the defined types were placed in this encounter.    The following information is provided to all patients.  This information is to help you find resources for any of the problems found today that may be affecting your health:                  Living healthy guide: ms.gov    Understanding Diabetes: www.diabetes.org      Eating healthy: www.cdc.gov/healthyweight      CDC home safety checklist: www.cdc.gov/steadi/patient.html      Agency on Aging: ms.gov    Alcoholics anonymous (AA): www.aa.org      Physical Activity: www.augusto.nih.gov/ia8stmp      Tobacco use: ms.gov

## 2024-06-04 ENCOUNTER — TELEPHONE (OUTPATIENT)
Dept: FAMILY MEDICINE | Facility: CLINIC | Age: 79
End: 2024-06-04
Payer: MEDICARE

## 2024-06-04 NOTE — TELEPHONE ENCOUNTER
----- Message from Cory Monk MD sent at 6/4/2024  4:47 PM CDT -----  Need to see in  1 week please  abnl results     [id number] Pt is scheduled to come in on 06/11/24

## 2024-06-11 ENCOUNTER — OFFICE VISIT (OUTPATIENT)
Dept: FAMILY MEDICINE | Facility: CLINIC | Age: 79
End: 2024-06-11
Payer: MEDICARE

## 2024-06-11 VITALS
RESPIRATION RATE: 17 BRPM | BODY MASS INDEX: 30.94 KG/M2 | HEIGHT: 71 IN | HEART RATE: 79 BPM | TEMPERATURE: 98 F | WEIGHT: 221 LBS | OXYGEN SATURATION: 99 % | DIASTOLIC BLOOD PRESSURE: 71 MMHG | SYSTOLIC BLOOD PRESSURE: 119 MMHG

## 2024-06-11 DIAGNOSIS — N18.9 CHRONIC KIDNEY DISEASE, UNSPECIFIED CKD STAGE: Primary | ICD-10-CM

## 2024-06-11 PROCEDURE — 99213 OFFICE O/P EST LOW 20 MIN: CPT | Mod: ,,, | Performed by: INTERNAL MEDICINE

## 2024-06-30 PROBLEM — N18.9 CHRONIC KIDNEY DISEASE: Status: ACTIVE | Noted: 2023-06-02

## 2024-07-01 NOTE — PROGRESS NOTES
Subjective:       Patient ID: Taina Medrano is a 79 y.o. female.    Chief Complaint: Bronchitis (Follow up )    HPI  .  Patient presents follow up evaluation denies fever chills chest pain shortness a breath I did inform her that she does have chronic kidney disease and refer her to Nephrology.  She denies chest pain and shortness a breath.  There is no evidence of any swelling.    Current Medications:    Current Outpatient Medications:     albuterol (PROVENTIL HFA) 90 mcg/actuation inhaler, Inhale 2 puffs into the lungs every 6 (six) hours as needed for Wheezing. Rescue, Disp: 6.7 g, Rfl: 3    amoxicillin-clavulanate 500-125mg (AUGMENTIN) 500-125 mg Tab, Take 1 tablet (500 mg total) by mouth 2 (two) times daily. (Patient not taking: Reported on 6/3/2024), Disp: 14 tablet, Rfl: 0    carvediloL (COREG) 25 MG tablet, Take 25 mg by mouth 2 (two) times daily with meals. , Disp: , Rfl:     ENTRESTO  mg per tablet, Take 1 tablet by mouth 2 (two) times daily. , Disp: , Rfl:     fluticasone propionate (FLONASE) 50 mcg/actuation nasal spray, 1 spray (50 mcg total) by Each Nostril route once daily. (Patient not taking: Reported on 6/3/2024), Disp: 16 g, Rfl: 5    furosemide (LASIX) 80 MG tablet, TAKE ONE TABLET BY MOUTH EVERY MORNING AND ONE-HALF(1/2) TABLET EVERY EVENING, Disp: 30 tablet, Rfl: 4    guaiFENesin (MUCINEX) 600 mg 12 hr tablet, Take 1 tablet (600 mg total) by mouth 2 (two) times daily. (Patient not taking: Reported on 6/3/2024), Disp: 14 tablet, Rfl: 0    potassium chloride (KLOR-CON) 10 MEQ TbSR, TAKE ONE TABLET BY MOUTH EVERY DAY (Patient not taking: Reported on 6/3/2024), Disp: 30 tablet, Rfl: 3    potassium chloride SA (K-DUR,KLOR-CON M) 10 MEQ tablet, Take 1 tablet (10 mEq total) by mouth once daily. (Patient not taking: Reported on 6/3/2024), Disp: 30 tablet, Rfl: 2    spironolactone (ALDACTONE) 25 MG tablet, Take 2 tablets (50 mg total) by mouth once daily., Disp: 90 tablet, Rfl: 1    "        ROS  Twelve point system reviewed, unremarkable except for stated above in HPI.        Objective:         Vitals:    06/11/24 1335   BP: 119/71   BP Location: Left arm   Patient Position: Sitting   BP Method: Large (Automatic)   Pulse: 79   Resp: 17   Temp: 97.5 °F (36.4 °C)   TempSrc: Temporal   SpO2: 99%   Weight: 100.2 kg (221 lb)   Height: 5' 11" (1.803 m)        Physical Exam     Patient is awake alert oriented person place and  Lungs are clear to auscultation bilaterally no crackles or wheezes   Cardiovascular S1-S2 regular rate and rhythm no murmurs rubs or gallops   Abdomen is soft positive bowel sounds nontender, extremities no clubbing cyanosis edema  Neuro no focal neurological deficits  Skin warm and dry.     Last Labs:     Office Visit on 06/03/2024   Component Date Value    Triglycerides 06/03/2024 113     Cholesterol 06/03/2024 181     HDL Cholesterol 06/03/2024 56     Cholesterol/HDL Ratio (R* 06/03/2024 3.2     Non-HDL 06/03/2024 125     LDL Calculated 06/03/2024 102     LDL/HDL 06/03/2024 1.8     VLDL 06/03/2024 23     Sodium 06/03/2024 141     Potassium 06/03/2024 4.0     Chloride 06/03/2024 107     CO2 06/03/2024 28     Anion Gap 06/03/2024 10     Glucose 06/03/2024 100     BUN 06/03/2024 25 (H)     Creatinine 06/03/2024 1.40 (H)     BUN/Creatinine Ratio 06/03/2024 18     Calcium 06/03/2024 9.3     eGFR 06/03/2024 38 (L)        Last Imaging:  X-Ray Chest PA And Lateral  Narrative: EXAMINATION:  XR CHEST PA AND LATERAL    CLINICAL HISTORY:  Bronchitis, not specified as acute or chronic    COMPARISON:  Chest x-ray June 13, 2023    TECHNIQUE:  Frontal and lateral views of the chest.    FINDINGS:  The cardiomediastinal silhouette is stable in configuration.  Cardiac conduction device again demonstrated.  Chronic change of the lungs without focal consolidation, pleural effusion, or pneumothorax.  Visualized osseous and surrounding soft tissue structures appear grossly unchanged.  Impression: " Stable chest x-ray without acute cardiopulmonary process demonstrated.    Point of Service: [facility]    Electronically signed by: Santy Tena  Date:    11/20/2023  Time:    11:47         **Labs and x-rays personally reviewed by me    ** reviewed           Assessment & Plan:       1. Chronic kidney disease, unspecified CKD stage  -     Ambulatory referral/consult to Nephrology; Future; Expected date: 06/18/2024            Cory Monk MD

## 2024-07-09 DIAGNOSIS — Z71.89 COMPLEX CARE COORDINATION: ICD-10-CM

## 2024-07-15 RX ORDER — FUROSEMIDE 80 MG/1
TABLET ORAL
Qty: 30 TABLET | Refills: 4 | Status: SHIPPED | OUTPATIENT
Start: 2024-07-15

## 2024-08-14 ENCOUNTER — OFFICE VISIT (OUTPATIENT)
Dept: OBSTETRICS AND GYNECOLOGY | Facility: CLINIC | Age: 79
End: 2024-08-14
Payer: MEDICARE

## 2024-08-14 VITALS
HEART RATE: 80 BPM | DIASTOLIC BLOOD PRESSURE: 58 MMHG | BODY MASS INDEX: 31.52 KG/M2 | WEIGHT: 226 LBS | SYSTOLIC BLOOD PRESSURE: 103 MMHG

## 2024-08-14 DIAGNOSIS — Z01.419 ENCOUNTER FOR GYNECOLOGICAL EXAMINATION WITHOUT ABNORMAL FINDING: Primary | ICD-10-CM

## 2024-08-14 DIAGNOSIS — M85.80 OSTEOPENIA, UNSPECIFIED LOCATION: ICD-10-CM

## 2024-08-14 PROCEDURE — G0101 CA SCREEN;PELVIC/BREAST EXAM: HCPCS | Mod: ,,, | Performed by: OBSTETRICS & GYNECOLOGY

## 2024-08-14 NOTE — PROGRESS NOTES
CC: Well woman exam    Taina Medrano is a 79 y.o. female  presents for well woman exam.    LMP: No LMP recorded. Patient is postmenopausal..    Last mammogram: 2023  Last Colonoscopy: unknown; Patient states she did a Cologuard with  on 2023.  Bone Density: 2023    Denies any issues, problems, or complaints.     Past Medical History:   Diagnosis Date    Asthma     Breast cancer     Cancer     MI, old      Past Surgical History:   Procedure Laterality Date    BREAST LUMPECTOMY Right     BREAST SURGERY      SINUS SURGERY       Social History     Socioeconomic History    Marital status: Single   Tobacco Use    Smoking status: Never     Passive exposure: Never    Smokeless tobacco: Never   Substance and Sexual Activity    Alcohol use: Never    Drug use: Never    Sexual activity: Not Currently     Partners: Male     Social Determinants of Health     Financial Resource Strain: Low Risk  (6/3/2024)    Overall Financial Resource Strain (CARDIA)     Difficulty of Paying Living Expenses: Not hard at all   Food Insecurity: No Food Insecurity (6/3/2024)    Hunger Vital Sign     Worried About Running Out of Food in the Last Year: Never true     Ran Out of Food in the Last Year: Never true   Transportation Needs: No Transportation Needs (6/3/2024)    PRAPARE - Transportation     Lack of Transportation (Medical): No     Lack of Transportation (Non-Medical): No   Physical Activity: Inactive (6/3/2024)    Exercise Vital Sign     Days of Exercise per Week: 0 days     Minutes of Exercise per Session: 0 min   Stress: No Stress Concern Present (6/3/2024)    [employer] - Occupational Stress Questionnaire     Feeling of Stress : Not at all   Housing Stability: Low Risk  (6/3/2024)    Housing Stability Vital Sign     Unable to Pay for Housing in the Last Year: No     Homeless in the Last Year: No     Family History   Problem Relation Name Age of Onset    Hypertension Mother       Cancer Sister      Cancer Brother      Hypertension Brother       OB History          2    Para   2    Term   2            AB        Living             SAB        IAB        Ectopic        Multiple        Live Births                     BP (!) 103/58   Pulse 80   Wt 102.5 kg (226 lb)   BMI 31.52 kg/m²       ROS:  GENERAL: Denies weight gain or weight loss. Feeling well overall.   SKIN: Denies rash or lesions.   HEAD: Denies head injury or headache.   NODES: Denies enlarged lymph nodes.   CHEST: Denies chest pain or shortness of breath.   CARDIOVASCULAR: Denies palpitations or left sided chest pain.   ABDOMEN: No abdominal pain, constipation, diarrhea, nausea, vomiting or rectal bleeding.   URINARY: No frequency, dysuria, hematuria, or burning on urination.  REPRODUCTIVE: See HPI.   BREASTS: The patient performs breast self-examination and denies pain, lumps, or nipple discharge.   HEMATOLOGIC: No easy bruisability or excessive bleeding.   MUSCULOSKELETAL: Denies joint pain or swelling.   NEUROLOGIC: Denies syncope or weakness.   PSYCHIATRIC: Denies depression, anxiety or mood swings.    PHYSICAL EXAM:  APPEARANCE: Well nourished, well developed, in no acute distress.  AFFECT: WNL, alert and oriented x 3  SKIN: No acne or hirsutism  NECK: Neck symmetric without masses or thyromegaly  NODES: No inguinal, cervical, axillary, or femoral lymph node enlargement  CHEST: Good respiratory effect  ABDOMEN: Soft.  No tenderness or masses.  No hepatosplenomegaly.  No hernias.  BREASTS: Symmetrical, no skin changes or visible lesions.  No palpable masses, nipple discharge bilaterally.  PELVIC: Normal external genitalia without lesions.  Normal hair distribution.  Adequate perineal body, normal urethral meatus.  Vagina moist and well rugated without lesions or discharge.  Cervix pink, without lesions, discharge or tenderness.  No significant cystocele or rectocele.  Bimanual exam shows uterus to be normal size,  regular, mobile and nontender.  Adnexa without masses or tenderness.    EXTREMITIES: No edema.    Encounter for gynecological examination without abnormal finding    Osteopenia, unspecified location        Pt is currently taking multivitamin with vitamins D and C    Patient was counseled today on A.C.S. Pap guidelines and recommendations for yearly pelvic exams, mammograms and monthly self breast exams; to see her PCP for other health maintenance.   Exercise regimen encouraged  Healthy food choices encouraged  Questions answered to desired level of satisfaction  Verbalized understanding to all information and instructions    Follow up in about 1 year (around 8/14/2025) for Annual.      Zack Estrada M.D., FCOG    OB/GYN

## 2024-09-02 PROBLEM — Z00.00 ENCOUNTER FOR SUBSEQUENT ANNUAL WELLNESS VISIT (AWV) IN MEDICARE PATIENT: Status: RESOLVED | Noted: 2023-06-02 | Resolved: 2024-09-02

## 2024-10-01 ENCOUNTER — OFFICE VISIT (OUTPATIENT)
Dept: FAMILY MEDICINE | Facility: CLINIC | Age: 79
End: 2024-10-01
Payer: MEDICARE

## 2024-10-01 VITALS
HEART RATE: 89 BPM | SYSTOLIC BLOOD PRESSURE: 114 MMHG | DIASTOLIC BLOOD PRESSURE: 67 MMHG | OXYGEN SATURATION: 98 % | TEMPERATURE: 99 F

## 2024-10-01 DIAGNOSIS — R05.8 PRODUCTIVE COUGH: Primary | ICD-10-CM

## 2024-10-01 DIAGNOSIS — R68.83 CHILLS (WITHOUT FEVER): ICD-10-CM

## 2024-10-01 DIAGNOSIS — R09.81 NASAL CONGESTION: ICD-10-CM

## 2024-10-01 DIAGNOSIS — R06.2 WHEEZING: ICD-10-CM

## 2024-10-01 DIAGNOSIS — J10.1 INFLUENZA A: ICD-10-CM

## 2024-10-01 LAB
CTP QC/QA: YES
MOLECULAR STREP A: NEGATIVE
POC MOLECULAR INFLUENZA A AGN: POSITIVE
POC MOLECULAR INFLUENZA B AGN: NEGATIVE
SARS-COV-2 RDRP RESP QL NAA+PROBE: NEGATIVE

## 2024-10-01 PROCEDURE — 99214 OFFICE O/P EST MOD 30 MIN: CPT | Mod: ,,, | Performed by: NURSE PRACTITIONER

## 2024-10-01 PROCEDURE — 94640 AIRWAY INHALATION TREATMENT: CPT | Mod: ,,, | Performed by: NURSE PRACTITIONER

## 2024-10-01 PROCEDURE — 87635 SARS-COV-2 COVID-19 AMP PRB: CPT | Mod: RHCUB | Performed by: NURSE PRACTITIONER

## 2024-10-01 PROCEDURE — 87651 STREP A DNA AMP PROBE: CPT | Mod: RHCUB | Performed by: NURSE PRACTITIONER

## 2024-10-01 PROCEDURE — 87502 INFLUENZA DNA AMP PROBE: CPT | Mod: RHCUB | Performed by: NURSE PRACTITIONER

## 2024-10-01 RX ORDER — OSELTAMIVIR PHOSPHATE 75 MG/1
75 CAPSULE ORAL 2 TIMES DAILY
Qty: 10 CAPSULE | Refills: 0 | Status: SHIPPED | OUTPATIENT
Start: 2024-10-01 | End: 2024-10-06

## 2024-10-01 RX ORDER — ALBUTEROL SULFATE 90 UG/1
2 INHALANT RESPIRATORY (INHALATION) EVERY 6 HOURS PRN
Qty: 6.7 G | Refills: 3 | Status: SHIPPED | OUTPATIENT
Start: 2024-10-01

## 2024-10-01 RX ORDER — ALBUTEROL SULFATE 0.83 MG/ML
2.5 SOLUTION RESPIRATORY (INHALATION)
Status: COMPLETED | OUTPATIENT
Start: 2024-10-01 | End: 2024-10-01

## 2024-10-01 RX ORDER — GUAIFENESIN 600 MG/1
1200 TABLET, EXTENDED RELEASE ORAL 2 TIMES DAILY
Qty: 14 TABLET | Refills: 0 | Status: SHIPPED | OUTPATIENT
Start: 2024-10-01

## 2024-10-01 RX ORDER — PREDNISONE 20 MG/1
20 TABLET ORAL DAILY
Qty: 5 TABLET | Refills: 0 | Status: SHIPPED | OUTPATIENT
Start: 2024-10-01 | End: 2024-10-06

## 2024-10-01 RX ADMIN — ALBUTEROL SULFATE 2.5 MG: 0.83 SOLUTION RESPIRATORY (INHALATION) at 04:10

## 2024-10-02 NOTE — PROGRESS NOTES
"[facility]  Chief Complaint      Chief Complaint   Patient presents with    Congestion     Patient presents to clinic for congestion. S/S: Chills (fever unknown), SOB, Lack of sleep, decreased appetite, productive yellow thick cough, sinus drainage. Tx tried Ventolin inhaler "I've been living off my inhaler the last couple days" and Tylenol.        History of Present Illness      Taina Medrano is a 79 y.o. female  who presents today for reports of chills, sob, productive cough of thick yellow sputum, fatigue, decreased appetite, and difficulty sleeping. The pt tested positive in clinic for flu today. .    HPI     Past Medical History:  Past Medical History:   Diagnosis Date    Asthma     Breast cancer     Cancer     MI, old        Past Surgical History:   has a past surgical history that includes Breast surgery; Breast lumpectomy (Right); and Sinus surgery.    Social History:  Social History     Tobacco Use    Smoking status: Never     Passive exposure: Never    Smokeless tobacco: Never   Substance Use Topics    Alcohol use: Never    Drug use: Never       I personally reviewed all past medical, surgical, and social.     Review of Systems   Constitutional:  Positive for appetite change, chills and fatigue. Negative for fever and unexpected weight change.   Respiratory:  Negative for cough, shortness of breath and wheezing.    Cardiovascular:  Negative for chest pain and leg swelling.   Gastrointestinal:  Negative for abdominal pain, constipation, diarrhea, nausea and vomiting.   Genitourinary:  Negative for decreased urine volume, difficulty urinating, dysuria and flank pain.   Musculoskeletal:  Negative for arthralgias and myalgias.   Skin:  Negative for color change and rash.   Neurological:  Negative for dizziness, syncope, weakness and headaches.   Psychiatric/Behavioral:  The patient is nervous/anxious.       Medications:  Outpatient Encounter Medications as of 10/1/2024 "   Medication Sig Dispense Refill    albuterol (PROVENTIL HFA) 90 mcg/actuation inhaler Inhale 2 puffs into the lungs every 6 (six) hours as needed for Wheezing. Rescue 6.7 g 3    amoxicillin-clavulanate 500-125mg (AUGMENTIN) 500-125 mg Tab Take 1 tablet (500 mg total) by mouth 2 (two) times daily. (Patient not taking: Reported on 6/3/2024) 14 tablet 0    carvediloL (COREG) 25 MG tablet Take 25 mg by mouth 2 (two) times daily with meals.       ENTRESTO  mg per tablet Take 1 tablet by mouth 2 (two) times daily.       fluticasone propionate (FLONASE) 50 mcg/actuation nasal spray 1 spray (50 mcg total) by Each Nostril route once daily. (Patient not taking: Reported on 6/3/2024) 16 g 5    furosemide (LASIX) 80 MG tablet TAKE 1 TABLET BY MOUTH EVERY MORNING AND 1/2 TABLET BY MOUTH EVERY EVENING 30 tablet 4    guaiFENesin (MUCINEX) 600 mg 12 hr tablet Take 2 tablets (1,200 mg total) by mouth 2 (two) times daily. 14 tablet 0    oseltamivir (TAMIFLU) 75 MG capsule Take 1 capsule (75 mg total) by mouth 2 (two) times daily. for 5 days 10 capsule 0    potassium chloride (KLOR-CON) 10 MEQ TbSR TAKE ONE TABLET BY MOUTH EVERY DAY (Patient not taking: Reported on 6/3/2024) 30 tablet 3    potassium chloride SA (K-DUR,KLOR-CON M) 10 MEQ tablet Take 1 tablet (10 mEq total) by mouth once daily. (Patient not taking: Reported on 6/3/2024) 30 tablet 2    predniSONE (DELTASONE) 20 MG tablet Take 1 tablet (20 mg total) by mouth once daily. for 5 days 5 tablet 0    spironolactone (ALDACTONE) 25 MG tablet Take 2 tablets (50 mg total) by mouth once daily. 90 tablet 1    [DISCONTINUED] albuterol (PROVENTIL HFA) 90 mcg/actuation inhaler Inhale 2 puffs into the lungs every 6 (six) hours as needed for Wheezing. Rescue (Patient not taking: Reported on 8/14/2024) 6.7 g 3    [DISCONTINUED] guaiFENesin (MUCINEX) 600 mg 12 hr tablet Take 1 tablet (600 mg total) by mouth 2 (two) times daily. (Patient not taking: Reported on 6/3/2024)  14 tablet 0     Facility-Administered Encounter Medications as of 10/1/2024   Medication Dose Route Frequency Provider Last Rate Last Admin    [COMPLETED] albuterol nebulizer solution 2.5 mg  2.5 mg Nebulization 1 time in Clinic/HOD Jessica Parks AGNP   2.5 mg at 10/01/24 1648       Allergies:  Review of patient's allergies indicates:  No Known Allergies    Health Maintenance:  Immunization History   Administered Date(s) Administered    COVID-19 MRNA, LN-S PF (MODERNA HALF 0.25 ML DOSE) 11/20/2021, 05/13/2022    COVID-19, MRNA, LN-S, PF (MODERNA FULL 0.5 ML DOSE) 01/22/2021, 03/01/2021    Influenza (FLUAD) - Quadrivalent - Adjuvanted - PF *Preferred* (65+) 12/22/2022    Influenza - Quadrivalent - PF *Preferred* (6 months and older) 10/21/2021    Pneumococcal Conjugate - 20 Valent 02/06/2023        Health Maintenance   Topic Date Due    Shingles Vaccine (1 of 2) Never done    TETANUS VACCINE  11/20/2024 (Originally 2/8/1963)    DEXA Scan  09/12/2026    Lipid Panel  06/03/2029    Hepatitis C Screening  Completed        Physical Exam      Vital Signs  Temp: 98.8 °F (37.1 °C)  Temp Source: Oral  Pulse: 89  SpO2: 98 %  BP: 114/67  BP Location: Right arm  Patient Position: Sitting]    Physical Exam  Constitutional:       General: She is not in acute distress.     Appearance: Normal appearance.   HENT:      Head: Normocephalic.      Right Ear: Tympanic membrane normal.      Left Ear: Tympanic membrane normal.      Nose:      Right Turbinates: Swollen.      Left Turbinates: Swollen.      Mouth/Throat:      Mouth: Mucous membranes are moist.      Pharynx: No pharyngeal swelling, oropharyngeal exudate or posterior oropharyngeal erythema.   Cardiovascular:      Rate and Rhythm: Normal rate and regular rhythm.      Pulses: Normal pulses.      Heart sounds: Normal heart sounds. No murmur heard.  Pulmonary:      Effort: Pulmonary effort is normal. No respiratory distress.      Breath sounds: Wheezing present.  "  Abdominal:      Palpations: Abdomen is soft.      Tenderness: There is no abdominal tenderness.   Musculoskeletal:         General: Normal range of motion.   Skin:     General: Skin is warm and dry.   Neurological:      Mental Status: She is alert and oriented to person, place, and time.   Psychiatric:         Mood and Affect: Mood normal.         Behavior: Behavior normal.        Laboratory:  CBC:  Recent Labs   Lab 02/21/22  1451 10/27/22  1649 11/20/23  1123   WBC 5.08 5.59 6.55   RBC 3.76 L 4.04 L 3.87 L   Hemoglobin 11.6 L 12.4 11.9 L   Hematocrit 35.5 L 38.0 36.3 L   Platelet Count 222 207 238   MCV 94.4 94.1 93.8   MCH 30.9 30.7 30.7   MCHC 32.7 32.6 32.8       LIPIDS:  Recent Labs   Lab 02/21/22  1451 05/11/22  1108 06/02/23  0921 06/03/24  0904   TSH 1.710  --   --   --    HDL Cholesterol  --  58 61 H 56   Cholesterol  --  179 168 181   Triglycerides  --  94 96 113   LDL Calculated  --  102 88 102   Cholesterol/HDL Ratio (Risk Factor)  --  3.1 2.8 3.2   Non-HDL  --  121 107 125       TSH:  Recent Labs   Lab 02/21/22  1451   TSH 1.710       A1C:        Lab Results   Component Value Date     06/03/2024     06/03/2024    K 4.0 06/03/2024     06/03/2024    CO2 28 06/03/2024    BUN 25 (H) 06/03/2024    CREATININE 1.40 (H) 06/03/2024    CALCIUM 9.3 06/03/2024    ANIONGAP 10 06/03/2024    ESTGFRAFRICA 46 (L) 10/25/2021    EGFRNONAA 33 (L) 02/21/2022       Lab Results   Component Value Date    WBC 6.55 11/20/2023    RBC 3.87 (L) 11/20/2023    HGB 11.9 (L) 11/20/2023    HCT 36.3 (L) 11/20/2023    MCV 93.8 11/20/2023    RDW 14.0 11/20/2023     11/20/2023        Lab Results   Component Value Date    CHOL 181 06/03/2024    TRIG 113 06/03/2024    HDL 56 06/03/2024    LDLCALC 102 06/03/2024       Lab Results   Component Value Date    TSH 1.710 02/21/2022       No results found for: "HGBA1C", "ESTIMATEDAVG"     No components found for: "VITAMIND"    No results found for: "PSA"    Point Of Care " "Testing:  No results found for: "WBCUR", "NITRITE", "UROBILINOGEN", "PROTEINPOC", "PHUR", "BLOODUR", "SPECGRAV", "KETONESU", "BILIRUBINPOC", "GLUCOSEUR"    No results found for: "TWMOYOT4OA", "RAPFLUA", "RAPFLUB"      Assessment/Plan     1. Productive cough  -     POCT COVID-19 Rapid Screening  -     POCT Influenza A/B Molecular  -     POCT Strep A, Molecular  -     guaiFENesin (MUCINEX) 600 mg 12 hr tablet; Take 2 tablets (1,200 mg total) by mouth 2 (two) times daily.  Dispense: 14 tablet; Refill: 0    2. Chills (without fever)  -     POCT COVID-19 Rapid Screening  -     POCT Influenza A/B Molecular  -     POCT Strep A, Molecular    3. Nasal congestion  -     POCT COVID-19 Rapid Screening  -     POCT Influenza A/B Molecular  -     POCT Strep A, Molecular    4. Wheezing  -     albuterol nebulizer solution 2.5 mg  -     albuterol (PROVENTIL HFA) 90 mcg/actuation inhaler; Inhale 2 puffs into the lungs every 6 (six) hours as needed for Wheezing. Rescue  Dispense: 6.7 g; Refill: 3  -     predniSONE (DELTASONE) 20 MG tablet; Take 1 tablet (20 mg total) by mouth once daily. for 5 days  Dispense: 5 tablet; Refill: 0    5. Influenza A  -     oseltamivir (TAMIFLU) 75 MG capsule; Take 1 capsule (75 mg total) by mouth 2 (two) times daily. for 5 days  Dispense: 10 capsule; Refill: 0           Return to clinic if symptoms worsen or fail to improve. .    Questions answered to desired level of satisfaction    Verbalized understanding to all information and instructions provided      NATALIE Barbour[facility]    "

## 2025-05-29 NOTE — PROGRESS NOTES
"  Taina Medrano presented for a follow-up Medicare AWV today. The following components were reviewed and updated:    Medical history  Family History  Social history  Allergies and Current Medications  Health Risk Assessment  Health Maintenance  Care Team    **See Completed Assessments for Annual Wellness visit with in the encounter summary    The following assessments were completed:  Depression Screening  Cognitive function Screening  Timed Get Up Test  Whisper Test      Opioid documentation:      Patient does not have a current opioid prescription.          Vitals:    06/02/25 1411   BP: 111/71   Pulse: 70   Resp: 16   Temp: 97.6 °F (36.4 °C)   TempSrc: Oral   SpO2: 99%   Weight: 100.7 kg (222 lb)   Height: 5' 11" (1.803 m)     Body mass index is 30.96 kg/m².       Physical Exam  Constitutional:       General: She is not in acute distress.     Appearance: Normal appearance. She is obese.   HENT:      Mouth/Throat:      Mouth: Mucous membranes are moist.   Cardiovascular:      Rate and Rhythm: Normal rate and regular rhythm.      Pulses: Normal pulses.      Heart sounds: No murmur heard.  Pulmonary:      Effort: Pulmonary effort is normal. No respiratory distress.      Breath sounds: No wheezing, rhonchi or rales.   Abdominal:      General: Bowel sounds are normal.      Palpations: Abdomen is soft.      Tenderness: There is no abdominal tenderness.   Musculoskeletal:         General: Normal range of motion.      Cervical back: Neck supple.   Skin:     General: Skin is warm and dry.   Neurological:      Mental Status: She is alert and oriented to person, place, and time.   Psychiatric:         Mood and Affect: Mood normal.         Behavior: Behavior normal.         Diagnoses and health risks identified today and associated recommendations/orders:    1. Encounter for subsequent annual wellness visit (AWV) in Medicare patient  Screenings performed, as noted above. Personal preventative testing needs reviewed.  Return for " AWV in 12 months or thereafter       2. Chronic kidney disease, unspecified CKD stage  Chronic -  Followed by PCP  BUN 25, Creatinine 1.40, eGFR 38  Avoid NSAID drug use    3. Primary hypertension  B/P 117/71, HR 70 bpm  Chronic - Stable - Followed up by PCP  Continue low sodium diet intake   Continue carvedilol 6.25 mg po BID  Continue entresto  mg po BID     4. Hyperlipidemia, unspecified hyperlipidemia type  Chronic - Stable - Followed by PCP  Lipids: Trig 113, Chol 181, HDL 56,   Pt is not on a statin rx     5. Osteoarthritis, unspecified osteoarthritis type, unspecified site  Chronic - Stable - Followed by PCP  Recommended OTC Tylenol as needed for arthritic pain    6. BMI 30.0-30.9,adult  Eat a well-balanced diet to include  vegetables, fruit, lean meats, and whole grains.  Activity as tolerated      Provided Taina with a 5-10 year written screening schedule and personal prevention plan. Recommendations were developed using the USPSTF age appropriate recommendations. Education, counseling, and referrals were provided as needed.  After Visit Summary printed and given to patient which includes a list of additional screenings\tests needed.  Health Maintenance Due   Topic Date Due    TETANUS VACCINE  Never done    Shingles Vaccine (1 of 2) Never done    RSV Vaccine (Age 60+ and Pregnant patients) (1 - 1-dose 75+ series) Never done    COVID-19 Vaccine (6 - 2024-25 season) 09/01/2024   Referral to pharmacy for tetanus, shingles, rsv and covid booster vaccines     Follow up for in 1 year for annual wellness visit 06/03/2026 1:00 p.m. .      WILLI Barbour      I offered to discuss advanced care planning, including how to pick a person who would make decisions for you if you were unable to make them for yourself, called a health care power of , and what kind of decisions you might make such as use of life sustaining treatments such as ventilators and tube feeding when faced with a life  limiting illness recorded on a living will that they will need to know. (How you want to be cared for as you near the end of your natural life)     X Patient is interested in learning more about how to make advanced directives.  I provided them paperwork and offered to discuss this with them.

## 2025-06-02 ENCOUNTER — OFFICE VISIT (OUTPATIENT)
Dept: FAMILY MEDICINE | Facility: CLINIC | Age: 80
End: 2025-06-02
Payer: MEDICARE

## 2025-06-02 VITALS
HEART RATE: 70 BPM | BODY MASS INDEX: 31.08 KG/M2 | TEMPERATURE: 98 F | OXYGEN SATURATION: 99 % | HEIGHT: 71 IN | WEIGHT: 222 LBS | RESPIRATION RATE: 16 BRPM | DIASTOLIC BLOOD PRESSURE: 71 MMHG | SYSTOLIC BLOOD PRESSURE: 111 MMHG

## 2025-06-02 DIAGNOSIS — M19.90 OSTEOARTHRITIS, UNSPECIFIED OSTEOARTHRITIS TYPE, UNSPECIFIED SITE: ICD-10-CM

## 2025-06-02 DIAGNOSIS — E78.5 HYPERLIPIDEMIA, UNSPECIFIED HYPERLIPIDEMIA TYPE: ICD-10-CM

## 2025-06-02 DIAGNOSIS — Z00.00 ENCOUNTER FOR SUBSEQUENT ANNUAL WELLNESS VISIT (AWV) IN MEDICARE PATIENT: Primary | ICD-10-CM

## 2025-06-02 DIAGNOSIS — N18.9 CHRONIC KIDNEY DISEASE, UNSPECIFIED CKD STAGE: ICD-10-CM

## 2025-06-02 DIAGNOSIS — I10 PRIMARY HYPERTENSION: ICD-10-CM

## 2025-06-02 PROCEDURE — G0439 PPPS, SUBSEQ VISIT: HCPCS | Mod: ,,, | Performed by: NURSE PRACTITIONER

## 2025-06-02 RX ORDER — FUROSEMIDE 40 MG/1
40 TABLET ORAL
COMMUNITY

## 2025-06-02 RX ORDER — CARVEDILOL 25 MG/1
TABLET ORAL
COMMUNITY

## 2025-06-02 RX ORDER — CETIRIZINE HYDROCHLORIDE 10 MG/1
TABLET ORAL
COMMUNITY

## 2025-06-02 NOTE — PATIENT INSTRUCTIONS
Counseling and Referral of Other Preventative  (Italic type indicates deductible and co-insurance are waived)    Patient Name: Taina Medrano  Today's Date: 6/2/2025    Health Maintenance       Date Due Completion Date    TETANUS VACCINE Never done ---    Shingles Vaccine (1 of 2) Never done ---    RSV Vaccine (Age 60+ and Pregnant patients) (1 - 1-dose 75+ series) Never done ---    COVID-19 Vaccine (6 - 2024-25 season) 09/01/2024 10/31/2023    Influenza Vaccine (Season Ended) 09/01/2025 12/22/2022    DEXA Scan 09/12/2026 9/12/2023    Lipid Panel 06/03/2029 6/3/2024        No orders of the defined types were placed in this encounter.    The following information is provided to all patients.  This information is to help you find resources for any of the problems found today that may be affecting your health:                  Living healthy guide: [place].gov    Understanding Diabetes: www.diabetes.org      Eating healthy: www.cdc.gov/healthyweight      CDC home safety checklist: www.cdc.gov/steadi/patient.html      Agency on Aging: [place]gov    Alcoholics anonymous (AA): www.aa.org      Physical Activity: www.augusto.nih.gov/aj3bukp      Tobacco use: [place].gov

## 2025-06-10 PROBLEM — Z00.00 ENCOUNTER FOR SUBSEQUENT ANNUAL WELLNESS VISIT (AWV) IN MEDICARE PATIENT: Status: ACTIVE | Noted: 2025-06-10

## 2025-07-15 ENCOUNTER — OFFICE VISIT (OUTPATIENT)
Dept: FAMILY MEDICINE | Facility: CLINIC | Age: 80
End: 2025-07-15
Payer: MEDICARE

## 2025-07-15 VITALS
HEIGHT: 71 IN | HEART RATE: 79 BPM | SYSTOLIC BLOOD PRESSURE: 103 MMHG | RESPIRATION RATE: 17 BRPM | WEIGHT: 224 LBS | TEMPERATURE: 97 F | BODY MASS INDEX: 31.36 KG/M2 | OXYGEN SATURATION: 96 % | DIASTOLIC BLOOD PRESSURE: 64 MMHG

## 2025-07-15 DIAGNOSIS — R05.9 COUGH, UNSPECIFIED TYPE: ICD-10-CM

## 2025-07-15 DIAGNOSIS — I10 PRIMARY HYPERTENSION: Primary | ICD-10-CM

## 2025-07-15 DIAGNOSIS — Z12.31 ENCOUNTER FOR SCREENING MAMMOGRAM FOR BREAST CANCER: ICD-10-CM

## 2025-07-15 LAB
ANION GAP SERPL CALCULATED.3IONS-SCNC: 9 MMOL/L (ref 7–16)
BUN SERPL-MCNC: 16 MG/DL (ref 10–20)
BUN/CREAT SERPL: 12 (ref 6–20)
CALCIUM SERPL-MCNC: 9.1 MG/DL (ref 8.4–10.2)
CHLORIDE SERPL-SCNC: 104 MMOL/L (ref 98–107)
CO2 SERPL-SCNC: 30 MMOL/L (ref 23–31)
CREAT SERPL-MCNC: 1.31 MG/DL (ref 0.55–1.02)
EGFR (NO RACE VARIABLE) (RUSH/TITUS): 41 ML/MIN/1.73M2
GLUCOSE SERPL-MCNC: 78 MG/DL (ref 82–115)
POTASSIUM SERPL-SCNC: 4 MMOL/L (ref 3.5–5.1)
SODIUM SERPL-SCNC: 139 MMOL/L (ref 136–145)

## 2025-07-15 PROCEDURE — 80048 BASIC METABOLIC PNL TOTAL CA: CPT | Mod: ,,, | Performed by: CLINICAL MEDICAL LABORATORY

## 2025-07-15 PROCEDURE — 99213 OFFICE O/P EST LOW 20 MIN: CPT | Mod: ,,, | Performed by: INTERNAL MEDICINE

## 2025-07-15 RX ORDER — BENZONATATE 200 MG/1
200 CAPSULE ORAL 2 TIMES DAILY PRN
COMMUNITY
End: 2025-07-15 | Stop reason: SDUPTHER

## 2025-07-15 RX ORDER — BENZONATATE 200 MG/1
200 CAPSULE ORAL 2 TIMES DAILY PRN
Qty: 30 CAPSULE | Refills: 0 | Status: SHIPPED | OUTPATIENT
Start: 2025-07-15

## 2025-07-15 NOTE — PROGRESS NOTES
"Subjective:       Patient ID: Taina Medrano is a 80 y.o. female.    Chief Complaint: Chronic Kidney Disease and Health Maintenance (TETANUS VACCINE Never done-refuses/Shingles Vaccine(1 of 2) Never done-refuses/RSV Vaccine (Age 60+ and Pregnant patients)(1 - 1-dose 75+ series) Never done-refuses/COVID-19 Vaccine(6 - 2024-25 season) due on 09/01/2024-refuses/DEXA Scan due on 09/12/2025 -refuses)    History of Present Illness    CHIEF COMPLAINT:  Patient presents today for routine checkup.    RESPIRATORY:  She reports chronic cough with associated congestion. She expresses a preference for pill form medication over liquid formulations.    MEDICAL HISTORY:  She has a history of congestive heart failure. She denies experiencing dizziness, weakness, or feeling faint.    PREVENTIVE CARE:  She reports routine annual mammograms but has not completed her mammogram screening this year.         Current Medications:  Current Medications[1]           ROS  Twelve point system reviewed, unremarkable except for stated above in HPI.        Objective:         Vitals:    07/15/25 1030   BP: 103/64   BP Location: Left arm   Patient Position: Sitting   Pulse: 79   Resp: 17   Temp: 97.2 °F (36.2 °C)   TempSrc: Temporal   SpO2: 96%   Weight: 101.6 kg (224 lb)   Height: 5' 11" (1.803 m)        Physical Exam     Patient is awake alert oriented person place and  Lungs are clear to auscultation bilaterally no crackles or wheezes   Cardiovascular S1-S2 regular rate and rhythm no murmurs rubs or gallops   Abdomen is soft positive bowel sounds nontender, extremities no clubbing cyanosis edema  Neuro no focal neurological deficits  Skin warm and dry.     Last Labs:     No visits with results within 1 Month(s) from this visit.   Latest known visit with results is:   Office Visit on 10/01/2024   Component Date Value    POC Rapid COVID 10/01/2024 Negative      Acceptab* 10/01/2024 Yes     POC Molecular Influenza * 10/01/2024 Positive " (A)     POC Molecular Influenza * 10/01/2024 Negative      Acceptab* 10/01/2024 Yes     Molecular Strep A, POC 10/01/2024 Negative      Acceptab* 10/01/2024 Yes        Last Imaging:  X-Ray Chest PA And Lateral  Narrative: EXAMINATION:  XR CHEST PA AND LATERAL    CLINICAL HISTORY:  Bronchitis, not specified as acute or chronic    COMPARISON:  Chest x-ray June 13, 2023    TECHNIQUE:  Frontal and lateral views of the chest.    FINDINGS:  The cardiomediastinal silhouette is stable in configuration.  Cardiac conduction device again demonstrated.  Chronic change of the lungs without focal consolidation, pleural effusion, or pneumothorax.  Visualized osseous and surrounding soft tissue structures appear grossly unchanged.  Impression: Stable chest x-ray without acute cardiopulmonary process demonstrated.    Point of Service: [facility]    Electronically signed by: Santy Tena  Date:    11/20/2023  Time:    11:47         **Labs and x-rays personally reviewed by me    ** reviewed           Assessment & Plan:   Assessment & Plan    IMPRESSION:  - Assessed lung sounds, noting congestion and chronic cough.  - Considered potential bronchitis or developing pneumonia due to age and symptoms.  - BP running lower than usual but appropriate for patient on heart failure medication.  - Reviewed need for routine labs and mammogram.    RESPIRATORY SYSTEM DISORDERS:  - Started cough medicine (pill form) PRN to break up mucus.    LABS:  - Ordered basic labs to check renal function, sodium, and potassium levels.    BREAST CANCER SCREENING:  - Ordered mammogram.           1. Primary hypertension  -     Basic Metabolic Panel; Future; Expected date: 07/15/2025    2. Encounter for screening mammogram for breast cancer  -     Mammo Digital Screening Bilat w/ Ramirez (XPD); Future; Expected date: 07/15/2025    3. Cough, unspecified type  -     benzonatate (TESSALON) 200 MG capsule; Take 1 capsule (200  mg total) by mouth 2 (two) times daily as needed for Cough.  Dispense: 30 capsule; Refill: 0            Cory Monk MD  This note was generated with the assistance of ambient listening technology. Verbal consent was obtained by the patient and accompanying visitor(s) for the recording of patient appointment to facilitate this note. I attest to having reviewed and edited the generated note for accuracy, though some syntax or spelling errors may persist. Please contact the author of this note for any clarification.            [1]   Current Outpatient Medications:     albuterol (PROVENTIL HFA) 90 mcg/actuation inhaler, Inhale 2 puffs into the lungs every 6 (six) hours as needed for Wheezing. Rescue, Disp: 6.7 g, Rfl: 3    calcium carbonate (CALCIUM 600 ORAL), Take by mouth., Disp: , Rfl:     calcium carbonate (CALCIUM 600 ORAL), Orally, Disp: , Rfl:     carvediloL (COREG) 25 MG tablet, Take 25 mg by mouth 2 (two) times daily with meals. , Disp: , Rfl:     carvediloL (COREG) 25 MG tablet, 1 tablet with food Orally Twice a day for 90 days, Disp: , Rfl:     cetirizine (ZYRTEC) 10 MG tablet, 1 tablet as needed Orally Once a day, Disp: , Rfl:     ENTRESTO  mg per tablet, Take 1 tablet by mouth 2 (two) times daily. , Disp: , Rfl:     furosemide (LASIX) 40 MG tablet, 40 mg., Disp: , Rfl:     furosemide (LASIX) 80 MG tablet, TAKE 1 TABLET BY MOUTH EVERY MORNING AND 1/2 TABLET BY MOUTH EVERY EVENING, Disp: 30 tablet, Rfl: 4    multivitamin (MULTIPLE VITAMINS ORAL), Take by mouth., Disp: , Rfl:     Multivitamins with Fluoride (MULTIVITAMINS W/FLUORIDE ORAL), Orally, Disp: , Rfl:     spironolactone (ALDACTONE) 25 MG tablet, Take 2 tablets (50 mg total) by mouth once daily., Disp: 90 tablet, Rfl: 1    benzonatate (TESSALON) 200 MG capsule, Take 1 capsule (200 mg total) by mouth 2 (two) times daily as needed for Cough., Disp: 30 capsule, Rfl: 0